# Patient Record
Sex: FEMALE | Race: WHITE | Employment: UNEMPLOYED | URBAN - METROPOLITAN AREA
[De-identification: names, ages, dates, MRNs, and addresses within clinical notes are randomized per-mention and may not be internally consistent; named-entity substitution may affect disease eponyms.]

---

## 2019-12-12 ENCOUNTER — HOSPITAL ENCOUNTER (EMERGENCY)
Age: 39
Discharge: HOME | End: 2019-12-12
Attending: EMERGENCY MEDICINE
Payer: MEDICAID

## 2019-12-12 ENCOUNTER — APPOINTMENT (OUTPATIENT)
Dept: ULTRASOUND IMAGING | Age: 39
End: 2019-12-12
Attending: NURSE PRACTITIONER
Payer: MEDICAID

## 2019-12-12 VITALS
DIASTOLIC BLOOD PRESSURE: 98 MMHG | RESPIRATION RATE: 18 BRPM | OXYGEN SATURATION: 100 % | TEMPERATURE: 98.2 F | HEART RATE: 86 BPM | SYSTOLIC BLOOD PRESSURE: 158 MMHG

## 2019-12-12 DIAGNOSIS — N93.8 DUB (DYSFUNCTIONAL UTERINE BLEEDING): Primary | ICD-10-CM

## 2019-12-12 LAB
ANION GAP SERPL CALC-SCNC: 7 MMOL/L (ref 5–15)
BUN SERPL-MCNC: 7 MG/DL (ref 6–20)
BUN/CREAT SERPL: 9 (ref 12–20)
CALCIUM SERPL-MCNC: 9.5 MG/DL (ref 8.5–10.1)
CHLORIDE SERPL-SCNC: 102 MMOL/L (ref 97–108)
CO2 SERPL-SCNC: 28 MMOL/L (ref 21–32)
CREAT SERPL-MCNC: 0.79 MG/DL (ref 0.55–1.02)
ERYTHROCYTE [DISTWIDTH] IN BLOOD BY AUTOMATED COUNT: 13.1 % (ref 11.5–14.5)
GLUCOSE SERPL-MCNC: 116 MG/DL (ref 65–100)
HCG SERPL-ACNC: <1 MIU/ML (ref 0–6)
HCT VFR BLD AUTO: 43.1 % (ref 35–47)
HGB BLD-MCNC: 14.1 G/DL (ref 11.5–16)
MCH RBC QN AUTO: 30.3 PG (ref 26–34)
MCHC RBC AUTO-ENTMCNC: 32.7 G/DL (ref 30–36.5)
MCV RBC AUTO: 92.5 FL (ref 80–99)
NRBC # BLD: 0 K/UL (ref 0–0.01)
NRBC BLD-RTO: 0 PER 100 WBC
PLATELET # BLD AUTO: 277 K/UL (ref 150–400)
PMV BLD AUTO: 11.5 FL (ref 8.9–12.9)
POTASSIUM SERPL-SCNC: 4 MMOL/L (ref 3.5–5.1)
RBC # BLD AUTO: 4.66 M/UL (ref 3.8–5.2)
SODIUM SERPL-SCNC: 137 MMOL/L (ref 136–145)
WBC # BLD AUTO: 10.6 K/UL (ref 3.6–11)

## 2019-12-12 PROCEDURE — 85027 COMPLETE CBC AUTOMATED: CPT

## 2019-12-12 PROCEDURE — 84702 CHORIONIC GONADOTROPIN TEST: CPT

## 2019-12-12 PROCEDURE — 99282 EMERGENCY DEPT VISIT SF MDM: CPT

## 2019-12-12 PROCEDURE — 80048 BASIC METABOLIC PNL TOTAL CA: CPT

## 2019-12-12 PROCEDURE — 36415 COLL VENOUS BLD VENIPUNCTURE: CPT

## 2019-12-12 PROCEDURE — 76830 TRANSVAGINAL US NON-OB: CPT

## 2019-12-12 PROCEDURE — 76856 US EXAM PELVIC COMPLETE: CPT

## 2019-12-12 RX ORDER — MEDROXYPROGESTERONE ACETATE 5 MG/1
10 TABLET ORAL DAILY
Qty: 14 TAB | Refills: 0 | Status: SHIPPED | OUTPATIENT
Start: 2019-12-12 | End: 2019-12-19

## 2019-12-12 NOTE — ED TRIAGE NOTES
TRIAGE NOTE:  Patient arrives with c/o vaginal bleeding x 13 days. Patient reports she soaks through one super tampon in 3-4 hours. Patient reports slight cramping \"here and there\".

## 2019-12-12 NOTE — LETTER
NOTIFICATION RETURN TO WORK / SCHOOL 
 
12/12/2019 4:28 PM 
 
Ms. Ina Moore Constantino [address] [id number] To Whom It May Concern: 
 
Ina Soliz is currently under the care of [facility] [place] [facility]. She will return to work/school on: December 17, 2019 If there are questions or concerns please have the patient contact our office.  
 
 
 
Sincerely, 
 
 
 
Lili Hickey NP  
4:28 PM

## 2019-12-12 NOTE — DISCHARGE INSTRUCTIONS
Patient Education        Abnormal Uterine Bleeding: Care Instructions  Your Care Instructions    Abnormal uterine bleeding (AUB) is irregular bleeding from the uterus that is longer or heavier than usual or does not occur at your regular time. Sometimes it is caused by changes in hormone levels. It can also be caused by growths in the uterus, such as fibroids or polyps. Sometimes a cause cannot be found. You may have heavy bleeding when you are not expecting your period. Your doctor may suggest a pregnancy test, if you think you are pregnant. Follow-up care is a key part of your treatment and safety. Be sure to make and go to all appointments, and call your doctor if you are having problems. It's also a good idea to know your test results and keep a list of the medicines you take. How can you care for yourself at home? · Be safe with medicines. Take pain medicines exactly as directed. ? If the doctor gave you a prescription medicine for pain, take it as prescribed. ? If you are not taking a prescription pain medicine, ask your doctor if you can take an over-the-counter medicine. · You may be low in iron because of blood loss. Eat a balanced diet that is high in iron and vitamin C. Foods rich in iron include red meat, shellfish, eggs, beans, and leafy green vegetables. Talk to your doctor about whether you need to take iron pills or a multivitamin. When should you call for help? Call 911 anytime you think you may need emergency care. For example, call if:    · You passed out (lost consciousness).    Call your doctor now or seek immediate medical care if:    · You have new or worse belly or pelvic pain.     · You have severe vaginal bleeding.     · You feel dizzy or lightheaded, or you feel like you may faint.    Watch closely for changes in your health, and be sure to contact your doctor if:    · You think you may be pregnant.     · Your bleeding gets worse.     · You do not get better as expected.    Where can you learn more? Go to http://stanley-kelly.info/. Enter C779 in the search box to learn more about \"Abnormal Uterine Bleeding: Care Instructions. \"  Current as of: February 19, 2019  Content Version: 12.2  © 4781-1902 [employer]. Care instructions adapted under license by [employer] (which disclaims liability or warranty for this information). If you have questions about a medical condition or this instruction, always ask your healthcare professional. [address] any warranty or liability for your use of this information. We hope that we have addressed all of your medical concerns. The examination and treatment you received in the Emergency Department were for an emergent problem and were not intended as complete care. It is important that you follow up with your healthcare provider(s) for ongoing care. If your symptoms worsen or do not improve as expected, and you are unable to reach your usual health care provider(s), you should return to the Emergency Department. Today's healthcare is undergoing tremendous change, and patient satisfaction surveys are one of the many tools to assess the quality of medical care. You may receive a survey from the [employer] regarding your experience in the Emergency Department. I hope that your experience has been completely positive, particularly the medical care that I provided. As such, please participate in the survey; anything less than excellent does not meet my expectations or intentions. [address] and [address] [place] [address] participate in nationally recognized quality of care measures. If your blood pressure is greater than 120/80, as reported below, we urge that you seek medical care to address the potential of high blood pressure, commonly known as hypertension.    Hypertension can be hereditary or can be caused by certain medical conditions, pain, stress, or \"white coat syndrome. \"       Please make an appointment with your health care provider(s) for follow up of your Emergency Department visit. VITALS:   Patient Vitals for the past 8 hrs:   Temp Pulse Resp BP SpO2   12/12/19 1403 98.2 °F (36.8 °C) 86 18 (!) 158/98 100 %          Thank you for allowing us to provide you with medical care today. We realize that you have many choices for your emergency care needs. Please choose us in the future for any continued health care needs. Kamilah Nice [facility], [facility].   Office: [phone]            Recent Results (from the past 24 hour(s))   CBC W/O DIFF    Collection Time: 12/12/19  2:41 PM   Result Value Ref Range    WBC 10.6 3.6 - 11.0 K/uL    RBC 4.66 3.80 - 5.20 M/uL    HGB 14.1 11.5 - 16.0 g/dL    HCT 43.1 35.0 - 47.0 %    MCV 92.5 80.0 - 99.0 FL    MCH 30.3 26.0 - 34.0 PG    MCHC 32.7 30.0 - 36.5 g/dL    RDW 13.1 11.5 - 14.5 %    PLATELET 980 116 - 097 K/uL    MPV 11.5 8.9 - 12.9 FL    NRBC 0.0 0  WBC    ABSOLUTE NRBC 0.00 0.00 - 0.11 K/uL   METABOLIC PANEL, BASIC    Collection Time: 12/12/19  2:41 PM   Result Value Ref Range    Sodium 137 136 - 145 mmol/L    Potassium 4.0 3.5 - 5.1 mmol/L    Chloride 102 97 - 108 mmol/L    CO2 28 21 - 32 mmol/L    Anion gap 7 5 - 15 mmol/L    Glucose 116 (H) 65 - 100 mg/dL    BUN 7 6 - 20 MG/DL    Creatinine 0.79 0.55 - 1.02 MG/DL    BUN/Creatinine ratio 9 (L) 12 - 20      GFR est AA >60 >60 ml/min/1.73m2    GFR est non-AA >60 >60 ml/min/1.73m2    Calcium 9.5 8.5 - 10.1 MG/DL   BETA HCG, QT    Collection Time: 12/12/19  2:41 PM   Result Value Ref Range    Beta HCG, QT <1 0 - 6 MIU/ML       Us Transvaginal    Result Date: 12/12/2019  INDICATION: bleeding Exam: Transabdominal and transvaginal ultrasound of the pelvis. Transvaginal ultrasound is performed to better evaluate the adnexa and endometrium.  Transabdominal: The uterus measures 9.4 cm x 6.2 cm x 5.9 cm. Echotexture of the uterus is normal. Endometrial stripe is not well visualized due to underdistended bladder. Ovaries are normal in size and echotexture. Right ovary measures 3.0 cm x 2.6 cm x 1.7 cm. Left ovary measures 2.9 cm x 2.0 cm x 2.3 cm. Vascular flow is noted in both ovaries. No adnexal mass is visualized. There is no free fluid in the pelvis. Transvaginal: The uterus measures 9.8 cm x 5.8 cm x 4.3 cm. There is a 3.8 cm x 4.2 cm x 2.3 cm fundal fibroid. Endometrial stripe is within normal limits for premenopausal female, measuring 6 mm. Ovaries are normal in size and echotexture. Right ovary measures 3.2 cm x 3.7 cm x 2.4 cm. Left ovary measures 2.9 cm x 3.9 cm x 2.9 cm. Vascular flow is noted in both ovaries. Follicles are noted in both ovaries. No adnexal mass is visualized. There is no free fluid in the pelvis. Note is made of multiple nabothian cysts. IMPRESSION: 1. 3.8 cm x 4.2 cm x 2.3 cm fundal fibroid. 2. Multiple nabothian cysts. Us Pelv Non Obs    Result Date: 12/12/2019  INDICATION: bleeding Exam: Transabdominal and transvaginal ultrasound of the pelvis. Transvaginal ultrasound is performed to better evaluate the adnexa and endometrium. Transabdominal: The uterus measures 9.4 cm x 6.2 cm x 5.9 cm. Echotexture of the uterus is normal. Endometrial stripe is not well visualized due to underdistended bladder. Ovaries are normal in size and echotexture. Right ovary measures 3.0 cm x 2.6 cm x 1.7 cm. Left ovary measures 2.9 cm x 2.0 cm x 2.3 cm. Vascular flow is noted in both ovaries. No adnexal mass is visualized. There is no free fluid in the pelvis. Transvaginal: The uterus measures 9.8 cm x 5.8 cm x 4.3 cm. There is a 3.8 cm x 4.2 cm x 2.3 cm fundal fibroid. Endometrial stripe is within normal limits for premenopausal female, measuring 6 mm. Ovaries are normal in size and echotexture. Right ovary measures 3.2 cm x 3.7 cm x 2.4 cm.  Left ovary measures 2.9 cm x 3.9 cm x 2.9 cm. Vascular flow is noted in both ovaries. Follicles are noted in both ovaries. No adnexal mass is visualized. There is no free fluid in the pelvis. Note is made of multiple nabothian cysts. IMPRESSION: 1. 3.8 cm x 4.2 cm x 2.3 cm fundal fibroid. 2. Multiple nabothian cysts.

## 2019-12-12 NOTE — ED NOTES
Discharge instructions given to pt. All questions answered and pt verbalized understanding. V/S stable @ time of discharge. Pt ambulatory out of unit. IV d/c'd from left forearm.

## 2019-12-14 NOTE — ED PROVIDER NOTES
The pt is a 46year old female  s/p tubal ligation who presents ambulatory from home to the ER with complaints of vaginal bleeding for the last 2 weeks. Historically her periods have only lasted two days. She reports utilizing 3 - 4 super tampons per day. Blood is dark and occasionally full of clots. No concern for STI. Pt denies fevers, chills, night sweats, chest pain, pressure, SOB, RAMON, PND, orthopnea, abdominal pain, n/v/d, melena, hematuria, dysuria, constipation, HA, dizziness, and syncope. History reviewed. No pertinent past medical history. Past Surgical History:  No date: HX ORTHOPAEDIC; Left      Comment:  plates  No date: HX TUBAL LIGATION    PCP:  None               History reviewed. No pertinent past medical history. Past Surgical History:   Procedure Laterality Date    HX ORTHOPAEDIC Left     plates    HX TUBAL LIGATION           History reviewed. No pertinent family history.     Social History     Socioeconomic History    Marital status:      Spouse name: Not on file    Number of children: Not on file    Years of education: Not on file    Highest education level: Not on file   Occupational History    Not on file   Social Needs    Financial resource strain: Not on file    Food insecurity:     Worry: Not on file     Inability: Not on file    Transportation needs:     Medical: Not on file     Non-medical: Not on file   Tobacco Use    Smoking status: Current Every Day Smoker     Packs/day: 0.50    Smokeless tobacco: Never Used   Substance and Sexual Activity    Alcohol use: No    Drug use: No    Sexual activity: Not on file   Lifestyle    Physical activity:     Days per week: Not on file     Minutes per session: Not on file    Stress: Not on file   Relationships    Social connections:     Talks on phone: Not on file     Gets together: Not on file     Attends Hinduism service: Not on file     Active member of club or organization: Not on file     Attends meetings of clubs or organizations: Not on file     Relationship status: Not on file    Intimate partner violence:     Fear of current or ex partner: Not on file     Emotionally abused: Not on file     Physically abused: Not on file     Forced sexual activity: Not on file   Other Topics Concern    Not on file   Social History Narrative    Not on file         ALLERGIES: Patient has no known allergies. Review of Systems   Constitutional: Negative for activity change, appetite change, chills, diaphoresis, fatigue, fever and unexpected weight change. HENT: Negative for congestion, ear pain, rhinorrhea, sinus pressure, sore throat and tinnitus. Eyes: Negative for photophobia, pain, discharge and visual disturbance. Respiratory: Negative for apnea, cough, choking, chest tightness, shortness of breath, wheezing and stridor. Cardiovascular: Negative for chest pain, palpitations and leg swelling. Gastrointestinal: Negative for abdominal pain, constipation, diarrhea, nausea and vomiting. Endocrine: Negative for polydipsia, polyphagia and polyuria. Genitourinary: Positive for vaginal bleeding. Negative for decreased urine volume, dyspareunia, dysuria, enuresis, flank pain, frequency, hematuria and urgency. Musculoskeletal: Negative for arthralgias, back pain, gait problem, myalgias and neck pain. Skin: Negative for color change, pallor, rash and wound. Allergic/Immunologic: Negative for immunocompromised state. Neurological: Negative for dizziness, seizures, syncope, weakness, light-headedness and headaches. Hematological: Does not bruise/bleed easily. Psychiatric/Behavioral: Negative for agitation and confusion. The patient is not nervous/anxious. Vitals:    12/12/19 1403   BP: (!) 158/98   Pulse: 86   Resp: 18   Temp: 98.2 °F (36.8 °C)   SpO2: 100%            Physical Exam  Vitals signs and nursing note reviewed. Constitutional:       General: She is not in acute distress.      Appearance: She is well-developed. She is not diaphoretic. HENT:      Head: Normocephalic. Right Ear: External ear normal.      Left Ear: External ear normal.      Mouth/Throat:      Pharynx: No oropharyngeal exudate. Eyes:      General: No scleral icterus. Right eye: No discharge. Left eye: No discharge. Conjunctiva/sclera: Conjunctivae normal.      Pupils: Pupils are equal, round, and reactive to light. Neck:      Musculoskeletal: Normal range of motion and neck supple. Thyroid: No thyromegaly. Vascular: No JVD. Trachea: No tracheal deviation. Cardiovascular:      Rate and Rhythm: Normal rate and regular rhythm. Heart sounds: Normal heart sounds. No murmur. No friction rub. No gallop. Pulmonary:      Effort: Pulmonary effort is normal. No respiratory distress. Breath sounds: Normal breath sounds. No stridor. No wheezing or rales. Chest:      Chest wall: No tenderness. Abdominal:      General: Bowel sounds are normal. There is no distension. Palpations: Abdomen is soft. There is no mass. Tenderness: There is no tenderness. There is no guarding or rebound. Musculoskeletal: Normal range of motion. General: No tenderness. Lymphadenopathy:      Cervical: No cervical adenopathy. Skin:     General: Skin is warm and dry. Coloration: Skin is not pale. Findings: No erythema or rash. Neurological:      Mental Status: She is alert and oriented to person, place, and time. Cranial Nerves: No cranial nerve deficit. Coordination: Coordination normal.      Deep Tendon Reflexes: Reflexes normal.   Psychiatric:         Behavior: Behavior normal.         Thought Content:  Thought content normal.         Judgment: Judgment normal.          MDM  Number of Diagnoses or Management Options  DUB (dysfunctional uterine bleeding):   Diagnosis management comments:    * routine laboratory data    * US pelvis   * HCG check        Amount and/or Complexity of Data Reviewed  Clinical lab tests: ordered and reviewed  Tests in the radiology section of CPT®: ordered and reviewed  Review and summarize past medical records: yes    Risk of Complications, Morbidity, and/or Mortality  General comments:    - stable, ambulatory pt in NAD    Patient Progress  Patient progress: stable         Procedures      1631 PM  Pt has been reevaluated. There are no new complaints, changes, or physical findings at this time. Medications have been reviewed w/ pt and/or family. Pt and/or family's questions have been answered. Pt and/or family expressed good understanding of the dx/tx/rx and is in agreement with plan of care. Pt instructed and agreed to f/u w/ GYN and to return to ED upon further deterioration. Pt is ready for discharge. LABORATORY TESTS:  No results found for this or any previous visit (from the past 12 hour(s)). IMAGING RESULTS:  US TRANSVAGINAL   Final Result   IMPRESSION:    1. 3.8 cm x 4.2 cm x 2.3 cm fundal fibroid. 2. Multiple nabothian cysts. US PELV NON OBS   Final Result   IMPRESSION:    1. 3.8 cm x 4.2 cm x 2.3 cm fundal fibroid. 2. Multiple nabothian cysts. No results found. MEDICATIONS GIVEN:  Medications - No data to display    IMPRESSION:  1. DUB (dysfunctional uterine bleeding)        PLAN:  1. Discharge Medication List as of 12/12/2019  4:28 PM      START taking these medications    Details   medroxyPROGESTERone (PROVERA) 5 mg tablet Take 2 Tabs by mouth daily for 7 days. , Print, Disp-14 Tab, R-0         CONTINUE these medications which have NOT CHANGED    Details   L. acidoph & paracasei- S therm- Bifido (MAAME-Q/RISAQUAD) 8 billion cell cap cap Take 1 Cap by mouth daily. , Print, Disp-14 Cap, R-0           2.    Follow-up Information     Follow up With Specialties Details Why Contact Info    Gynecologist  In 2 days      [address] [facility] Emergency Medicine  As needed, If symptoms worsen [address] [facility] [address] [facility]  [phone]        3.  Supportive care     Return to ED if worse       Travis De Oliveira NP  9:31 PM

## 2020-04-01 ENCOUNTER — HOSPITAL ENCOUNTER (EMERGENCY)
Age: 40
Discharge: HOME | End: 2020-04-01
Attending: EMERGENCY MEDICINE
Payer: SELF-PAY

## 2020-04-01 VITALS
TEMPERATURE: 97.6 F | SYSTOLIC BLOOD PRESSURE: 143 MMHG | HEART RATE: 70 BPM | DIASTOLIC BLOOD PRESSURE: 97 MMHG | RESPIRATION RATE: 18 BRPM | OXYGEN SATURATION: 100 %

## 2020-04-01 DIAGNOSIS — J06.9 ACUTE URI: ICD-10-CM

## 2020-04-01 DIAGNOSIS — R05.9 COUGH: Primary | ICD-10-CM

## 2020-04-01 PROCEDURE — 99283 EMERGENCY DEPT VISIT LOW MDM: CPT

## 2020-04-01 NOTE — DISCHARGE INSTRUCTIONS
Patient Education        Cough: Care Instructions  Your Care Instructions    A cough is your body's response to something that bothers your throat or airways. Many things can cause a cough. You might cough because of a cold or the flu, bronchitis, or asthma. Smoking, postnasal drip, allergies, and stomach acid that backs up into your throat also can cause coughs. A cough is a symptom, not a disease. Most coughs stop when the cause, such as a cold, goes away. You can take a few steps at home to cough less and feel better. Follow-up care is a key part of your treatment and safety. Be sure to make and go to all appointments, and call your doctor if you are having problems. It's also a good idea to know your test results and keep a list of the medicines you take. How can you care for yourself at home? · Drink lots of water and other fluids. This helps thin the mucus and soothes a dry or sore throat. Honey or lemon juice in hot water or tea may ease a dry cough. · Take cough medicine as directed by your doctor. · Prop up your head on pillows to help you breathe and ease a dry cough. · Try cough drops to soothe a dry or sore throat. Cough drops don't stop a cough. Medicine-flavored cough drops are no better than candy-flavored drops or hard candy. · Do not smoke. Avoid secondhand smoke. If you need help quitting, talk to your doctor about stop-smoking programs and medicines. These can increase your chances of quitting for good. When should you call for help? Call 911 anytime you think you may need emergency care.  For example, call if:    · You have severe trouble breathing.    Call your doctor now or seek immediate medical care if:    · You cough up blood.     · You have new or worse trouble breathing.     · You have a new or higher fever.     · You have a new rash.    Watch closely for changes in your health, and be sure to contact your doctor if:    · You cough more deeply or more often, especially if you notice more mucus or a change in the color of your mucus.     · You have new symptoms, such as a sore throat, an earache, or sinus pain.     · You do not get better as expected. Where can you learn more? Go to http://stanley-kelly.info/  Enter D279 in the search box to learn more about \"Cough: Care Instructions. \"  Current as of: June 9, 2019Content Version: 12.4  © 2444-7380 [employer]. Care instructions adapted under license by [employer] (which disclaims liability or warranty for this information). If you have questions about a medical condition or this instruction, always ask your healthcare professional. [place] [address] any warranty or liability for your use of this information. Patient Education        Upper Respiratory Infection (Cold): Care Instructions  Your Care Instructions    An upper respiratory infection, or URI, is an infection of the nose, sinuses, or throat. URIs are spread by coughs, sneezes, and direct contact. The common cold is the most frequent kind of URI. The flu and sinus infections are other kinds of URIs. Almost all URIs are caused by viruses. Antibiotics won't cure them. But you can treat most infections with home care. This may include drinking lots of fluids and taking over-the-counter pain medicine. You will probably feel better in 4 to 10 days. The doctor has checked you carefully, but problems can develop later. If you notice any problems or new symptoms, get medical treatment right away. Follow-up care is a key part of your treatment and safety. Be sure to make and go to all appointments, and call your doctor if you are having problems. It's also a good idea to know your test results and keep a list of the medicines you take. How can you care for yourself at home? · To prevent dehydration, drink plenty of fluids, enough so that your urine is light yellow or clear like water.  Choose water and other caffeine-free clear liquids until you feel better. If you have kidney, heart, or liver disease and have to limit fluids, talk with your doctor before you increase the amount of fluids you drink. · Take an over-the-counter pain medicine, such as acetaminophen (Tylenol), ibuprofen (Advil, Motrin), or naproxen (Aleve). Read and follow all instructions on the label. · Before you use cough and cold medicines, check the label. These medicines may not be safe for young children or for people with certain health problems. · Be careful when taking over-the-counter cold or flu medicines and Tylenol at the same time. Many of these medicines have acetaminophen, which is Tylenol. Read the labels to make sure that you are not taking more than the recommended dose. Too much acetaminophen (Tylenol) can be harmful. · Get plenty of rest.  · Do not smoke or allow others to smoke around you. If you need help quitting, talk to your doctor about stop-smoking programs and medicines. These can increase your chances of quitting for good. When should you call for help? Call 911 anytime you think you may need emergency care. For example, call if:    · You have severe trouble breathing.    Call your doctor now or seek immediate medical care if:    · You seem to be getting much sicker.     · You have new or worse trouble breathing.     · You have a new or higher fever.     · You have a new rash.    Watch closely for changes in your health, and be sure to contact your doctor if:    · You have a new symptom, such as a sore throat, an earache, or sinus pain.     · You cough more deeply or more often, especially if you notice more mucus or a change in the color of your mucus.     · You do not get better as expected. Where can you learn more? Go to http://stanley-kelly.info/  Enter K520 in the search box to learn more about \"Upper Respiratory Infection (Cold): Care Instructions. \"  Current as of: June 9, 2019Content Version: 12.4  © 1318-5486 [employer]. Care instructions adapted under license by [employer] (which disclaims liability or warranty for this information). If you have questions about a medical condition or this instruction, always ask your healthcare professional. [address] any warranty or liability for your use of this information. Patient Education        Viral Infections: Care Instructions  Your Care Instructions    You don't feel well, but it's not clear what's causing it. You may have a viral infection. Viruses cause many illnesses, such as the common cold, influenza, fever, rashes, and the diarrhea, nausea, and vomiting that are often called \"stomach flu. \" You may wonder if antibiotic medicines could make you feel better. But antibiotics only treat infections caused by bacteria. They don't work on viruses. The good news is that viral infections usually aren't serious. Most will go away in a few days without medical treatment. In the meantime, there are a few things you can do to make yourself more comfortable. Follow-up care is a key part of your treatment and safety. Be sure to make and go to all appointments, and call your doctor if you are having problems. It's also a good idea to know your test results and keep a list of the medicines you take. How can you care for yourself at home? · Get plenty of rest if you feel tired. · Take an over-the-counter pain medicine if needed, such as acetaminophen (Tylenol), ibuprofen (Advil, Motrin), or naproxen (Aleve). Read and follow all instructions on the label. · Be careful when taking over-the-counter cold or flu medicines and Tylenol at the same time. Many of these medicines have acetaminophen, which is Tylenol. Read the labels to make sure that you are not taking more than the recommended dose. Too much acetaminophen (Tylenol) can be harmful.   · Drink plenty of fluids, enough so that your urine is light yellow or clear like water. If you have kidney, heart, or liver disease and have to limit fluids, talk with your doctor before you increase the amount of fluids you drink. · Stay home from work, school, and other public places while you have a fever. When should you call for help? Call 911 anytime you think you may need emergency care. For example, call if:    · You have severe trouble breathing.     · You passed out (lost consciousness).    Call your doctor now or seek immediate medical care if:    · You seem to be getting much sicker.     · You have a new or higher fever.     · You have blood in your stools.     · You have new belly pain, or your pain gets worse.     · You have a new rash.    Watch closely for changes in your health, and be sure to contact your doctor if:    · You start to get better and then get worse.     · You do not get better as expected. Where can you learn more? Go to http://stanley-kelly.info/  Enter L906 in the search box to learn more about \"Viral Infections: Care Instructions. \"  Current as of: January 26, 2020Content Version: 12.4  © 0239-0038 [employer]wise, Incorporated. Care instructions adapted under license by [employer] (which disclaims liability or warranty for this information). If you have questions about a medical condition or this instruction, always ask your healthcare professional. [address] any warranty or liability for your use of this information.

## 2020-04-01 NOTE — ED PROVIDER NOTES
HPI patient is a 44-year-old white female presents the ED with reports that she returned from [place] on 19 March. ; she reports symptoms of cough, intermittent chest pain, and nasal congestion for 3 to 4 days. Denies fever, headache, neck pain, visual changes, focal weakness or rash. Denies any difficulty breathing, difficulty swallowing, SOB or  Abdominal pain. Denies any nausea, vomiting or diarrhea. Pt. Reports that she has not had any medications today prior to arrival.  Old charts reviewed      No past medical history on file. Past Surgical History:   Procedure Laterality Date    HX ORTHOPAEDIC Left     plates    HX TUBAL LIGATION           No family history on file.     Social History     Socioeconomic History    Marital status:      Spouse name: Not on file    Number of children: Not on file    Years of education: Not on file    Highest education level: Not on file   Occupational History    Not on file   Social Needs    Financial resource strain: Not on file    Food insecurity     Worry: Not on file     Inability: Not on file    Transportation needs     Medical: Not on file     Non-medical: Not on file   Tobacco Use    Smoking status: Current Every Day Smoker     Packs/day: 0.50    Smokeless tobacco: Never Used   Substance and Sexual Activity    Alcohol use: No    Drug use: No    Sexual activity: Not on file   Lifestyle    Physical activity     Days per week: Not on file     Minutes per session: Not on file    Stress: Not on file   Relationships    Social connections     Talks on phone: Not on file     Gets together: Not on file     Attends [employer] service: Not on file     Active member of club or organization: Not on file     Attends meetings of clubs or organizations: Not on file     Relationship status: Not on file    Intimate partner violence     Fear of current or ex partner: Not on file     Emotionally abused: Not on file     Physically abused: Not on file     Forced sexual activity: Not on file   Other Topics Concern    Not on file   Social History Narrative    Not on file         ALLERGIES: Patient has no known allergies. Review of Systems   Constitutional: Negative for activity change, appetite change, fever and unexpected weight change. HENT: Positive for congestion and rhinorrhea. Negative for ear pain, sore throat and trouble swallowing. Eyes: Negative for visual disturbance. Respiratory: Positive for cough and chest tightness. Negative for shortness of breath. Cardiovascular: Negative for chest pain, palpitations and leg swelling. Gastrointestinal: Negative for abdominal pain, diarrhea, nausea and vomiting. Genitourinary: Negative for difficulty urinating and dysuria. Musculoskeletal: Negative for arthralgias, back pain and myalgias. Skin: Negative for rash. Neurological: Negative for dizziness, light-headedness and headaches. All other systems reviewed and are negative. Vitals:    04/01/20 1543   BP: (!) 143/97   Pulse: 70   Resp: 18   Temp: 97.6 °F (36.4 °C)   SpO2: 100%            Physical Exam  Vitals signs and nursing note reviewed. Constitutional:       General: She is not in acute distress. Appearance: She is well-developed. She is not ill-appearing, toxic-appearing or diaphoretic. Comments: White female; former smoker; presently vapes; unemployed   HENT:      Head: Normocephalic. Mouth/Throat:      Mouth: Mucous membranes are moist.      Pharynx: No pharyngeal swelling or oropharyngeal exudate. Neck:      Musculoskeletal: Normal range of motion and neck supple. Cardiovascular:      Rate and Rhythm: Normal rate and regular rhythm. Pulmonary:      Effort: Pulmonary effort is normal.      Breath sounds: Normal breath sounds. Chest:      Chest wall: No tenderness. Abdominal:      Palpations: Abdomen is soft. Tenderness: There is no abdominal tenderness. There is no guarding or rebound.    Musculoskeletal: Normal range of motion. Lymphadenopathy:      Cervical: No cervical adenopathy. Skin:     General: Skin is warm and dry. Findings: No rash. Neurological:      General: No focal deficit present. Mental Status: She is alert. Psychiatric:         Mood and Affect: Mood normal.         Behavior: Behavior normal.          MDM       Procedures    Pt appears well ;recommend supportive treatment at home with rest, fluids, Motrin and/or Tylenol as needed for pain or fever. Close follow-up with PCP as needed. 4:18 PM  Patient's results and plan of care have been reviewed with her. Patient and/or family have verbally conveyed their understanding and agreement of the patient's signs, symptoms, diagnosis, treatment and prognosis and additionally agree to follow up as recommended or return to the Emergency Room should her condition change prior to follow-up. Discharge instructions have also been provided to the patient with some educational information regarding her diagnosis as well a list of reasons why she would want to return to the ER prior to her follow-up appointment should her condition change. Sahara Christianson NP

## 2020-04-01 NOTE — ED NOTES
Discharge instructions given to patient by PA/NP and RN. Pt has been given counseling regarding at home treatment plan. Pt verbalizes understanding of need to seek further treatment if symptoms worsen. Pt ambulated off of unit in no signs of distress.

## 2020-04-01 NOTE — ED TRIAGE NOTES
TRIAGE NOTE:  Patient arrives with c/o cough since 3/16 and reports chest pain and shortness of breath x 5 days with headache and intermittent diarrhea. Patient denies known sick contacts, only recent travel to [place].

## 2020-04-02 ENCOUNTER — PATIENT OUTREACH (OUTPATIENT)
Dept: CASE MANAGEMENT | Age: 40
End: 2020-04-02

## 2020-04-02 ENCOUNTER — OFFICE VISIT (OUTPATIENT)
Dept: PRIMARY CARE CLINIC | Age: 40
End: 2020-04-02

## 2020-04-02 DIAGNOSIS — R51.9 HEADACHE DUE TO VIRAL INFECTION: ICD-10-CM

## 2020-04-02 DIAGNOSIS — R05.9 COUGH: Primary | ICD-10-CM

## 2020-04-02 DIAGNOSIS — R19.7 DIARRHEA, UNSPECIFIED TYPE: ICD-10-CM

## 2020-04-02 DIAGNOSIS — B34.9 HEADACHE DUE TO VIRAL INFECTION: ICD-10-CM

## 2020-04-02 DIAGNOSIS — F17.200 SMOKER: ICD-10-CM

## 2020-04-02 DIAGNOSIS — R06.02 MILD SHORTNESS OF BREATH: ICD-10-CM

## 2020-04-02 LAB
QUICKVUE INFLUENZA TEST: NEGATIVE
VALID INTERNAL CONTROL?: YES

## 2020-04-02 RX ORDER — AZITHROMYCIN 250 MG/1
TABLET, FILM COATED ORAL
Qty: 6 TAB | Refills: 0 | Status: SHIPPED | OUTPATIENT
Start: 2020-04-02 | End: 2020-04-07

## 2020-04-02 NOTE — PROGRESS NOTES
COVID-19 Screening Initial Follow-up Note    Patient contacted regarding [id number] exposure. ( possible exposure- just returned from [place] on 3/19 and then developed sx's)      Care Transition Nurse/ [address] [place] [address] contacted the patient by telephone to perform post discharge assessment. Verified name and  with patient as identifiers. Provided introduction to self, and explanation of the CTN/ACM role, and reason for call due to risk factors for infection and/or exposure to COVID-19. Symptoms reviewed with patient who verbalized the following symptoms:   Patient reports no change in sx's since yesterday. Reported yesterday to have cough/SOB, chest pain, headache and diarrhea. Due to no new or worsening symptoms encounter was not routed to provider for escalation. Patient has following risk factors of: none. CTN/ACM reviewed discharge instructions, medical action plan and red flags such as increased shortness of breath, increasing fever and signs of decompensation with patient who verbalized understanding. Discussed exposure protocols and quarantine with CDC Guidelines What to do if you are sick with coronavirus disease 2019 Patient who was given an opportunity for questions and concerns. CTN/ACM provided contact information for future reference. Reviewed and educated patient on any new and changed medications related to discharge diagnosis     Plan for follow-up call in 5-7 days based on severity of symptoms and risk factors    Patient reports recent travel to [place] and returned 3/19. Patient reports her symptoms today have not changed much from yesterday. Would like to know where she can be tested to be sure. Discussed that [facility] in [place] was doing tests. She should call in advance to let them know she believes she is positive so they can take proper precautions if she comes in to test. Discussed self quarantine.  Patient reports her BF lives with her and he has no sx's except some congestion , discussed that as he has been continuously exposed to her they need to both self quarantine together for 14 days. If he should develop sx's later he needs to continue quarantine for 14 days after developing sx's. Patient notes understanding and reports they have been staying at home. Patient plans to call [facility] today as she would like testing.  ACM will reach out to her in 3-5 days to see how she is doing and if she had a test.

## 2020-04-02 NOTE — PROGRESS NOTES
Patient was seen at [facility] drive thru flu clinic. Please seen scanned form for visit documentation. No PCP.   Travel to [place] 3/3-3/16  Sx started 3/16 and not improving  + smoker  Has been pushing fluids but no OTC meds    Will rx antbx due to duration of sx, smoker

## 2020-04-08 ENCOUNTER — PATIENT OUTREACH (OUTPATIENT)
Dept: CASE MANAGEMENT | Age: 40
End: 2020-04-08

## 2020-04-08 NOTE — PROGRESS NOTES
ACM contacted patient for follow up on how she is doing and if she went for COVID testing .  Unable to reach patient and left  for return call with contact info provided

## 2020-04-21 ENCOUNTER — PATIENT OUTREACH (OUTPATIENT)
Dept: CASE MANAGEMENT | Age: 40
End: 2020-04-21

## 2021-09-22 ENCOUNTER — APPOINTMENT (OUTPATIENT)
Dept: CT IMAGING | Age: 41
End: 2021-09-22
Attending: STUDENT IN AN ORGANIZED HEALTH CARE EDUCATION/TRAINING PROGRAM
Payer: MEDICAID

## 2021-09-22 ENCOUNTER — HOSPITAL ENCOUNTER (EMERGENCY)
Age: 41
Discharge: HOME | End: 2021-09-22
Attending: STUDENT IN AN ORGANIZED HEALTH CARE EDUCATION/TRAINING PROGRAM
Payer: MEDICAID

## 2021-09-22 VITALS
HEART RATE: 57 BPM | SYSTOLIC BLOOD PRESSURE: 155 MMHG | RESPIRATION RATE: 18 BRPM | DIASTOLIC BLOOD PRESSURE: 91 MMHG | TEMPERATURE: 97.8 F | OXYGEN SATURATION: 98 %

## 2021-09-22 DIAGNOSIS — M54.2 NECK PAIN: Primary | ICD-10-CM

## 2021-09-22 LAB
ALBUMIN SERPL-MCNC: 4.1 G/DL (ref 3.5–5)
ALBUMIN/GLOB SERPL: 0.8 {RATIO} (ref 1.1–2.2)
ALP SERPL-CCNC: 82 U/L (ref 45–117)
ALT SERPL-CCNC: 51 U/L (ref 12–78)
ANION GAP SERPL CALC-SCNC: 6 MMOL/L (ref 5–15)
AST SERPL-CCNC: 35 U/L (ref 15–37)
BASOPHILS # BLD: 0 K/UL (ref 0–0.1)
BASOPHILS NFR BLD: 1 % (ref 0–1)
BILIRUB SERPL-MCNC: 0.8 MG/DL (ref 0.2–1)
BUN SERPL-MCNC: 7 MG/DL (ref 6–20)
BUN/CREAT SERPL: 8 (ref 12–20)
CALCIUM SERPL-MCNC: 9.9 MG/DL (ref 8.5–10.1)
CHLORIDE SERPL-SCNC: 103 MMOL/L (ref 97–108)
CO2 SERPL-SCNC: 29 MMOL/L (ref 21–32)
COMMENT, HOLDF: NORMAL
CREAT SERPL-MCNC: 0.85 MG/DL (ref 0.55–1.02)
DIFFERENTIAL METHOD BLD: ABNORMAL
EOSINOPHIL # BLD: 0.2 K/UL (ref 0–0.4)
EOSINOPHIL NFR BLD: 3 % (ref 0–7)
ERYTHROCYTE [DISTWIDTH] IN BLOOD BY AUTOMATED COUNT: 13 % (ref 11.5–14.5)
GLOBULIN SER CALC-MCNC: 5.2 G/DL (ref 2–4)
GLUCOSE SERPL-MCNC: 67 MG/DL (ref 65–100)
HCT VFR BLD AUTO: 49.7 % (ref 35–47)
HGB BLD-MCNC: 16.2 G/DL (ref 11.5–16)
IMM GRANULOCYTES # BLD AUTO: 0 K/UL (ref 0–0.04)
IMM GRANULOCYTES NFR BLD AUTO: 0 % (ref 0–0.5)
LYMPHOCYTES # BLD: 2.3 K/UL (ref 0.8–3.5)
LYMPHOCYTES NFR BLD: 29 % (ref 12–49)
MCH RBC QN AUTO: 29.9 PG (ref 26–34)
MCHC RBC AUTO-ENTMCNC: 32.6 G/DL (ref 30–36.5)
MCV RBC AUTO: 91.7 FL (ref 80–99)
MONOCYTES # BLD: 0.6 K/UL (ref 0–1)
MONOCYTES NFR BLD: 7 % (ref 5–13)
NEUTS SEG # BLD: 5 K/UL (ref 1.8–8)
NEUTS SEG NFR BLD: 60 % (ref 32–75)
NRBC # BLD: 0 K/UL (ref 0–0.01)
NRBC BLD-RTO: 0 PER 100 WBC
PLATELET # BLD AUTO: 302 K/UL (ref 150–400)
PMV BLD AUTO: 11.3 FL (ref 8.9–12.9)
POTASSIUM SERPL-SCNC: 3.5 MMOL/L (ref 3.5–5.1)
PROT SERPL-MCNC: 9.3 G/DL (ref 6.4–8.2)
RBC # BLD AUTO: 5.42 M/UL (ref 3.8–5.2)
SAMPLES BEING HELD,HOLD: NORMAL
SODIUM SERPL-SCNC: 138 MMOL/L (ref 136–145)
WBC # BLD AUTO: 8.1 K/UL (ref 3.6–11)

## 2021-09-22 PROCEDURE — 99283 EMERGENCY DEPT VISIT LOW MDM: CPT

## 2021-09-22 PROCEDURE — 36415 COLL VENOUS BLD VENIPUNCTURE: CPT

## 2021-09-22 PROCEDURE — 74011000636 HC RX REV CODE- 636: Performed by: RADIOLOGY

## 2021-09-22 PROCEDURE — 70491 CT SOFT TISSUE NECK W/DYE: CPT

## 2021-09-22 PROCEDURE — 80053 COMPREHEN METABOLIC PANEL: CPT

## 2021-09-22 PROCEDURE — 85025 COMPLETE CBC W/AUTO DIFF WBC: CPT

## 2021-09-22 RX ADMIN — IOPAMIDOL 100 ML: 612 INJECTION, SOLUTION INTRAVENOUS at 03:11

## 2021-09-22 NOTE — ED TRIAGE NOTES
Patient arrives ambulatory from home. States she injected cocaine into her left neck a few days ago. Patient now has swelling and tenderness to the site. +chills. Patient also endorses chest pain.

## 2021-09-22 NOTE — ED PROVIDER NOTES
Patient is a 52-year-old female the past medical history of hepatitis C and IV drug use who presents to ED complaining of neck pain and chills which started earlier today. Patient reports she recently injected cocaine into her left side of her neck a few days ago. Reports since then she has developed slight swelling to left side of neck. Also reports she has developed chills, body aches, diaphoresis and generalized weakness. States she injected into her arm one time prior and developed an abscess and is insure if this is an abscess. Patient denies any fever, chest pain, palpitations, skin erythema/warmth/wound drainage. Past Medical History:   Diagnosis Date    Hepatitis C 2019       Past Surgical History:   Procedure Laterality Date    HX ORTHOPAEDIC Left     plates    HX TUBAL LIGATION           History reviewed. No pertinent family history. Social History     Socioeconomic History    Marital status:      Spouse name: Not on file    Number of children: Not on file    Years of education: Not on file    Highest education level: Not on file   Occupational History    Not on file   Tobacco Use    Smoking status: Current Every Day Smoker     Packs/day: 0.50    Smokeless tobacco: Never Used   Substance and Sexual Activity    Alcohol use: No    Drug use: Yes     Types: Cocaine, Marijuana    Sexual activity: Not on file   Other Topics Concern    Not on file   Social History Narrative    Not on file     Social Determinants of Health     Financial Resource Strain:     Difficulty of Paying Living Expenses:    Food Insecurity:     Worried About Running Out of Food in the Last Year:     [address] in the Last Year:    Transportation Needs:     Lack of Transportation (Medical):      Lack of Transportation (Non-Medical):    Physical Activity:     Days of Exercise per Week:     Minutes of Exercise per Session:    Stress:     Feeling of Stress :    Social Connections:     Frequency of Communication with Friends and Family:     Frequency of Social Gatherings with Friends and Family:     Attends Yazidism Services:     Active Member of Clubs or Organizations:     Attends Club or Organization Meetings:     Marital Status:    Intimate Partner Violence:     Fear of Current or Ex-Partner:     Emotionally Abused:     Physically Abused:     Sexually Abused: ALLERGIES: Patient has no known allergies. Review of Systems   Constitutional: Positive for chills, diaphoresis and fatigue. Negative for activity change, appetite change and fever. HENT: Negative for congestion and sore throat. Eyes: Negative for pain and visual disturbance. Respiratory: Negative for cough and shortness of breath. Cardiovascular: Negative for chest pain, palpitations and leg swelling. Gastrointestinal: Negative for abdominal distention, abdominal pain, constipation, diarrhea, nausea and vomiting. Genitourinary: Negative for decreased urine volume, dysuria, flank pain, frequency and urgency. Musculoskeletal: Positive for myalgias and neck pain. Negative for back pain. Skin: Negative for rash and wound. Allergic/Immunologic: Negative for immunocompromised state. Neurological: Negative for dizziness, syncope, weakness, light-headedness, numbness and headaches. Psychiatric/Behavioral: Negative for confusion. All other systems reviewed and are negative. Vitals:    09/22/21 0051   BP: (!) 148/100   Pulse: 80   Resp: 16   Temp: 97.8 °F (36.6 °C)   SpO2: 98%            Physical Exam  Vitals and nursing note reviewed. Constitutional:       General: She is not in acute distress. Appearance: Normal appearance. She is well-developed. She is not toxic-appearing. HENT:      Head: Normocephalic and atraumatic. Nose: Nose normal.      Mouth/Throat:      Mouth: Mucous membranes are moist.   Eyes:      General: Lids are normal.      Extraocular Movements: Extraocular movements intact. Conjunctiva/sclera: Conjunctivae normal.   Neck:      Comments: Track marks noted to left side of neck near carotid. Slight swelling noted- no erythema, warmth or drainage. No palpable fluctuance or induration. Cardiovascular:      Rate and Rhythm: Normal rate and regular rhythm. Pulses: Normal pulses. Heart sounds: Normal heart sounds, S1 normal and S2 normal.   Pulmonary:      Effort: Pulmonary effort is normal. No accessory muscle usage. Breath sounds: Normal breath sounds. Abdominal:      Palpations: Abdomen is soft. Tenderness: There is no abdominal tenderness. Musculoskeletal:         General: Normal range of motion. Cervical back: Normal range of motion and neck supple. Skin:     General: Skin is warm and dry. Capillary Refill: Capillary refill takes less than 2 seconds. Neurological:      General: No focal deficit present. Mental Status: She is alert and oriented to person, place, and time. Mental status is at baseline. Psychiatric:         Attention and Perception: Attention normal.         Mood and Affect: Mood and affect normal.         Speech: Speech normal.         Behavior: Behavior is cooperative. Thought Content: Thought content normal.         Cognition and Memory: Cognition normal.         Judgment: Judgment normal.          MDM  Number of Diagnoses or Management Options  Diagnosis management comments: Patient presents with skin problem and concern for abscess in her neck after injecting cocaine into left anterior neck a few days ago. VSS. CBC and CMP ordered. Dr. Gaetano Qureshi performed bedside ultrasound and advised to order CT neck soft tissue to r/o abscess or other acute abnormalities.         Amount and/or Complexity of Data Reviewed  Clinical lab tests: reviewed and ordered  Tests in the radiology section of CPT®: ordered  Discuss the patient with other providers: yes (Dr. Gaetano Qureshi, ED attending )           Procedures

## 2021-09-22 NOTE — DISCHARGE INSTRUCTIONS
You presented to the ED with pain in her neck after injection. Ultrasound showed possible collection of fluid and therefore CT with contrast was done. No significant abnormalities on CT concerning for abscess however you do have an enlarged thyroid with multiple nodules and you should follow-up with your primary care physician. Do not have a primary care physician please use information with your discharge paperwork to obtain 1.

## 2021-10-15 ENCOUNTER — APPOINTMENT (OUTPATIENT)
Dept: CT IMAGING | Age: 41
End: 2021-10-15
Attending: EMERGENCY MEDICINE
Payer: MEDICAID

## 2021-10-15 ENCOUNTER — HOSPITAL ENCOUNTER (EMERGENCY)
Age: 41
Discharge: LEFT BEFORE DISCHARGE | End: 2021-10-15
Payer: MEDICAID

## 2021-10-15 VITALS
TEMPERATURE: 98.6 F | OXYGEN SATURATION: 100 % | WEIGHT: 222 LBS | HEIGHT: 69 IN | SYSTOLIC BLOOD PRESSURE: 140 MMHG | HEART RATE: 76 BPM | RESPIRATION RATE: 18 BRPM | DIASTOLIC BLOOD PRESSURE: 114 MMHG | BODY MASS INDEX: 32.88 KG/M2

## 2021-10-15 LAB
ALBUMIN SERPL-MCNC: 3.7 G/DL (ref 3.5–5)
ALBUMIN/GLOB SERPL: 0.9 {RATIO} (ref 1.1–2.2)
ALP SERPL-CCNC: 68 U/L (ref 45–117)
ALT SERPL-CCNC: 65 U/L (ref 12–78)
ANION GAP SERPL CALC-SCNC: 1 MMOL/L (ref 5–15)
AST SERPL-CCNC: 33 U/L (ref 15–37)
BASOPHILS # BLD: 0 K/UL (ref 0–0.1)
BASOPHILS NFR BLD: 0 % (ref 0–1)
BILIRUB SERPL-MCNC: 0.3 MG/DL (ref 0.2–1)
BUN SERPL-MCNC: 11 MG/DL (ref 6–20)
BUN/CREAT SERPL: 15 (ref 12–20)
CALCIUM SERPL-MCNC: 9.1 MG/DL (ref 8.5–10.1)
CHLORIDE SERPL-SCNC: 107 MMOL/L (ref 97–108)
CO2 SERPL-SCNC: 29 MMOL/L (ref 21–32)
CREAT SERPL-MCNC: 0.74 MG/DL (ref 0.55–1.02)
DIFFERENTIAL METHOD BLD: NORMAL
EOSINOPHIL # BLD: 0.1 K/UL (ref 0–0.4)
EOSINOPHIL NFR BLD: 1 % (ref 0–7)
ERYTHROCYTE [DISTWIDTH] IN BLOOD BY AUTOMATED COUNT: 13 % (ref 11.5–14.5)
GLOBULIN SER CALC-MCNC: 4.2 G/DL (ref 2–4)
GLUCOSE SERPL-MCNC: 73 MG/DL (ref 65–100)
HCT VFR BLD AUTO: 43.1 % (ref 35–47)
HGB BLD-MCNC: 14.3 G/DL (ref 11.5–16)
IMM GRANULOCYTES # BLD AUTO: 0 K/UL (ref 0–0.04)
IMM GRANULOCYTES NFR BLD AUTO: 0 % (ref 0–0.5)
INR PPP: 1 (ref 0.9–1.1)
LYMPHOCYTES # BLD: 2.1 K/UL (ref 0.8–3.5)
LYMPHOCYTES NFR BLD: 20 % (ref 12–49)
MAGNESIUM SERPL-MCNC: 2.1 MG/DL (ref 1.6–2.4)
MCH RBC QN AUTO: 30.1 PG (ref 26–34)
MCHC RBC AUTO-ENTMCNC: 33.2 G/DL (ref 30–36.5)
MCV RBC AUTO: 90.7 FL (ref 80–99)
MONOCYTES # BLD: 0.6 K/UL (ref 0–1)
MONOCYTES NFR BLD: 6 % (ref 5–13)
NEUTS SEG # BLD: 7.5 K/UL (ref 1.8–8)
NEUTS SEG NFR BLD: 73 % (ref 32–75)
NRBC # BLD: 0 K/UL (ref 0–0.01)
NRBC BLD-RTO: 0 PER 100 WBC
PLATELET # BLD AUTO: 281 K/UL (ref 150–400)
PMV BLD AUTO: 11.4 FL (ref 8.9–12.9)
POTASSIUM SERPL-SCNC: 3.7 MMOL/L (ref 3.5–5.1)
PROT SERPL-MCNC: 7.9 G/DL (ref 6.4–8.2)
PROTHROMBIN TIME: 10.1 SEC (ref 9–11.1)
RBC # BLD AUTO: 4.75 M/UL (ref 3.8–5.2)
SODIUM SERPL-SCNC: 137 MMOL/L (ref 136–145)
T3FREE SERPL-MCNC: 3.2 PG/ML (ref 2.2–4)
T4 FREE SERPL-MCNC: 1.1 NG/DL (ref 0.8–1.5)
TSH SERPL DL<=0.05 MIU/L-ACNC: 0.2 UIU/ML (ref 0.36–3.74)
WBC # BLD AUTO: 10.4 K/UL (ref 3.6–11)

## 2021-10-15 PROCEDURE — 84481 FREE ASSAY (FT-3): CPT

## 2021-10-15 PROCEDURE — 93005 ELECTROCARDIOGRAM TRACING: CPT

## 2021-10-15 PROCEDURE — 36415 COLL VENOUS BLD VENIPUNCTURE: CPT

## 2021-10-15 PROCEDURE — 84443 ASSAY THYROID STIM HORMONE: CPT

## 2021-10-15 PROCEDURE — 84439 ASSAY OF FREE THYROXINE: CPT

## 2021-10-15 PROCEDURE — 80053 COMPREHEN METABOLIC PANEL: CPT

## 2021-10-15 PROCEDURE — 75810000275 HC EMERGENCY DEPT VISIT NO LEVEL OF CARE

## 2021-10-15 PROCEDURE — 83735 ASSAY OF MAGNESIUM: CPT

## 2021-10-15 PROCEDURE — 85610 PROTHROMBIN TIME: CPT

## 2021-10-15 PROCEDURE — 85025 COMPLETE CBC W/AUTO DIFF WBC: CPT

## 2021-10-16 LAB
ATRIAL RATE: 70 BPM
CALCULATED P AXIS, ECG09: 31 DEGREES
CALCULATED R AXIS, ECG10: -18 DEGREES
CALCULATED T AXIS, ECG11: 12 DEGREES
DIAGNOSIS, 93000: NORMAL
P-R INTERVAL, ECG05: 118 MS
Q-T INTERVAL, ECG07: 418 MS
QRS DURATION, ECG06: 78 MS
QTC CALCULATION (BEZET), ECG08: 451 MS
VENTRICULAR RATE, ECG03: 70 BPM

## 2021-10-23 ENCOUNTER — APPOINTMENT (OUTPATIENT)
Dept: GENERAL RADIOLOGY | Age: 41
End: 2021-10-23
Attending: EMERGENCY MEDICINE
Payer: MEDICAID

## 2021-10-23 ENCOUNTER — HOSPITAL ENCOUNTER (EMERGENCY)
Age: 41
Discharge: LEFT BEFORE DISCHARGE | End: 2021-10-23
Admitting: EMERGENCY MEDICINE
Payer: MEDICAID

## 2021-10-23 VITALS
RESPIRATION RATE: 18 BRPM | WEIGHT: 222 LBS | DIASTOLIC BLOOD PRESSURE: 87 MMHG | BODY MASS INDEX: 32.88 KG/M2 | TEMPERATURE: 98.7 F | SYSTOLIC BLOOD PRESSURE: 130 MMHG | HEIGHT: 69 IN | OXYGEN SATURATION: 99 % | HEART RATE: 67 BPM

## 2021-10-23 LAB
ALBUMIN SERPL-MCNC: 3.8 G/DL (ref 3.5–5)
ALBUMIN/GLOB SERPL: 0.8 {RATIO} (ref 1.1–2.2)
ALP SERPL-CCNC: 78 U/L (ref 45–117)
ALT SERPL-CCNC: 96 U/L (ref 12–78)
ANION GAP SERPL CALC-SCNC: 5 MMOL/L (ref 5–15)
AST SERPL-CCNC: 48 U/L (ref 15–37)
BASOPHILS # BLD: 0.1 K/UL (ref 0–0.1)
BASOPHILS NFR BLD: 1 % (ref 0–1)
BILIRUB SERPL-MCNC: 0.6 MG/DL (ref 0.2–1)
BNP SERPL-MCNC: 84 PG/ML
BUN SERPL-MCNC: 7 MG/DL (ref 6–20)
BUN/CREAT SERPL: 11 (ref 12–20)
CALCIUM SERPL-MCNC: 9.4 MG/DL (ref 8.5–10.1)
CHLORIDE SERPL-SCNC: 108 MMOL/L (ref 97–108)
CK SERPL-CCNC: 36 U/L (ref 26–192)
CO2 SERPL-SCNC: 24 MMOL/L (ref 21–32)
CREAT SERPL-MCNC: 0.65 MG/DL (ref 0.55–1.02)
DIFFERENTIAL METHOD BLD: ABNORMAL
EOSINOPHIL # BLD: 0.1 K/UL (ref 0–0.4)
EOSINOPHIL NFR BLD: 1 % (ref 0–7)
ERYTHROCYTE [DISTWIDTH] IN BLOOD BY AUTOMATED COUNT: 13 % (ref 11.5–14.5)
GLOBULIN SER CALC-MCNC: 4.5 G/DL (ref 2–4)
GLUCOSE SERPL-MCNC: 86 MG/DL (ref 65–100)
HCT VFR BLD AUTO: 43 % (ref 35–47)
HGB BLD-MCNC: 14.9 G/DL (ref 11.5–16)
IMM GRANULOCYTES # BLD AUTO: 0 K/UL (ref 0–0.04)
IMM GRANULOCYTES NFR BLD AUTO: 0 % (ref 0–0.5)
LYMPHOCYTES # BLD: 2 K/UL (ref 0.8–3.5)
LYMPHOCYTES NFR BLD: 16 % (ref 12–49)
MCH RBC QN AUTO: 30.3 PG (ref 26–34)
MCHC RBC AUTO-ENTMCNC: 34.7 G/DL (ref 30–36.5)
MCV RBC AUTO: 87.4 FL (ref 80–99)
MONOCYTES # BLD: 0.7 K/UL (ref 0–1)
MONOCYTES NFR BLD: 6 % (ref 5–13)
NEUTS SEG # BLD: 9.6 K/UL (ref 1.8–8)
NEUTS SEG NFR BLD: 76 % (ref 32–75)
NRBC # BLD: 0 K/UL (ref 0–0.01)
NRBC BLD-RTO: 0 PER 100 WBC
PLATELET # BLD AUTO: 299 K/UL (ref 150–400)
PMV BLD AUTO: 11 FL (ref 8.9–12.9)
POTASSIUM SERPL-SCNC: 3.6 MMOL/L (ref 3.5–5.1)
PROT SERPL-MCNC: 8.3 G/DL (ref 6.4–8.2)
RBC # BLD AUTO: 4.92 M/UL (ref 3.8–5.2)
SODIUM SERPL-SCNC: 137 MMOL/L (ref 136–145)
TROPONIN-HIGH SENSITIVITY: 4 NG/L (ref 0–51)
TSH SERPL DL<=0.05 MIU/L-ACNC: 0.22 UIU/ML (ref 0.36–3.74)
WBC # BLD AUTO: 12.3 K/UL (ref 3.6–11)

## 2021-10-23 PROCEDURE — 75810000275 HC EMERGENCY DEPT VISIT NO LEVEL OF CARE

## 2021-10-23 PROCEDURE — 71046 X-RAY EXAM CHEST 2 VIEWS: CPT

## 2021-10-23 PROCEDURE — 82550 ASSAY OF CK (CPK): CPT

## 2021-10-23 PROCEDURE — 84443 ASSAY THYROID STIM HORMONE: CPT

## 2021-10-23 PROCEDURE — 83880 ASSAY OF NATRIURETIC PEPTIDE: CPT

## 2021-10-23 PROCEDURE — 84484 ASSAY OF TROPONIN QUANT: CPT

## 2021-10-23 PROCEDURE — 36415 COLL VENOUS BLD VENIPUNCTURE: CPT

## 2021-10-23 PROCEDURE — 85025 COMPLETE CBC W/AUTO DIFF WBC: CPT

## 2021-10-23 PROCEDURE — 80053 COMPREHEN METABOLIC PANEL: CPT

## 2021-10-23 PROCEDURE — 93005 ELECTROCARDIOGRAM TRACING: CPT

## 2021-10-24 LAB
ATRIAL RATE: 65 BPM
CALCULATED P AXIS, ECG09: 33 DEGREES
CALCULATED R AXIS, ECG10: -22 DEGREES
CALCULATED T AXIS, ECG11: 12 DEGREES
DIAGNOSIS, 93000: NORMAL
P-R INTERVAL, ECG05: 122 MS
Q-T INTERVAL, ECG07: 414 MS
QRS DURATION, ECG06: 76 MS
QTC CALCULATION (BEZET), ECG08: 430 MS
VENTRICULAR RATE, ECG03: 65 BPM

## 2021-11-15 ENCOUNTER — OFFICE VISIT (OUTPATIENT)
Dept: INTERNAL MEDICINE CLINIC | Age: 41
End: 2021-11-15
Payer: MEDICAID

## 2021-11-15 VITALS
BODY MASS INDEX: 33.09 KG/M2 | DIASTOLIC BLOOD PRESSURE: 84 MMHG | TEMPERATURE: 98.4 F | HEIGHT: 69 IN | SYSTOLIC BLOOD PRESSURE: 135 MMHG | OXYGEN SATURATION: 97 % | WEIGHT: 223.4 LBS | RESPIRATION RATE: 20 BRPM | HEART RATE: 62 BPM

## 2021-11-15 DIAGNOSIS — D72.829 LEUKOCYTOSIS, UNSPECIFIED TYPE: ICD-10-CM

## 2021-11-15 DIAGNOSIS — E04.2 MULTIPLE THYROID NODULES: ICD-10-CM

## 2021-11-15 DIAGNOSIS — F41.9 ANXIETY: ICD-10-CM

## 2021-11-15 DIAGNOSIS — F14.10: ICD-10-CM

## 2021-11-15 DIAGNOSIS — K21.9 GASTROESOPHAGEAL REFLUX DISEASE WITHOUT ESOPHAGITIS: ICD-10-CM

## 2021-11-15 DIAGNOSIS — B18.2 CHRONIC HEPATITIS C WITHOUT HEPATIC COMA (HCC): ICD-10-CM

## 2021-11-15 DIAGNOSIS — Z76.89 ESTABLISHING CARE WITH NEW DOCTOR, ENCOUNTER FOR: Primary | ICD-10-CM

## 2021-11-15 DIAGNOSIS — E66.9 OBESITY (BMI 30-39.9): ICD-10-CM

## 2021-11-15 PROCEDURE — 99204 OFFICE O/P NEW MOD 45 MIN: CPT | Performed by: FAMILY MEDICINE

## 2021-11-15 RX ORDER — PHENOL/SODIUM PHENOLATE
20 AEROSOL, SPRAY (ML) MUCOUS MEMBRANE DAILY
Qty: 30 TABLET | Refills: 2 | Status: SHIPPED | OUTPATIENT
Start: 2021-11-15 | End: 2021-11-16 | Stop reason: SDUPTHER

## 2021-11-15 RX ORDER — HYDROXYZINE HYDROCHLORIDE 10 MG/1
10 TABLET, FILM COATED ORAL
Qty: 30 TABLET | Refills: 0 | Status: SHIPPED | OUTPATIENT
Start: 2021-11-15 | End: 2021-11-16 | Stop reason: SDUPTHER

## 2021-11-15 NOTE — PATIENT INSTRUCTIONS
Thyroid Nodules: Care Instructions  Your Care Instructions  Thyroid nodules are growths or lumps in the thyroid gland. Your thyroid is in the front of your neck. It controls how your body uses energy. You may have tests to see if the nodule is caused by cancer. Most nodules aren't cancer and don't cause problems. Many don't even need treatment. If you do have cancer, it can usually be cured. Treatment will probably include surgery. You may also get radioactive iodine treatment. If your thyroid can't make thyroid hormone after treatment, you can take a pill every day to replace the hormone. Follow-up care is a key part of your treatment and safety. Be sure to make and go to all appointments, and call your doctor if you are having problems. It's also a good idea to know your test results and keep a list of the medicines you take. How can you care for yourself at home? · Be safe with medicines. If you take thyroid hormone medicine:  ? Take it exactly as prescribed. Call your doctor if you think you are having a problem with your medicine. If you take the right amount and don't skip doses, you probably won't have side effects. ? Tell your doctor about any medicines you take. This includes over-the-counter medicines. When should you call for help? Call 911 anytime you think you may need emergency care. For example, call if:    · You lose consciousness. Call your doctor now or seek immediate medical care if:    · You have shortness of breath. Watch closely for changes in your health, and be sure to contact your doctor if:    · You have pain in your neck, jaw, or ear.     · You have problems swallowing.     · You feel weak and tired.     · You have nervousness, a fast heartbeat, hand tremors, problems sleeping, increased sweating, and weight loss.     · You do not feel better even though you are taking your medicine. Where can you learn more?   Go to http://www.gray.com/  Enter F731 in the search box to learn more about \"Thyroid Nodules: Care Instructions. \"  Current as of: December 2, 2020               Content Version: 13.0  © 2006-2021 [employer]. Care instructions adapted under license by [employer] (which disclaims liability or warranty for this information). If you have questions about a medical condition or this instruction, always ask your healthcare professional. [address] any warranty or liability for your use of this information. Learning About Hepatitis C  What is hepatitis C? Hepatitis C is a disease caused by a virus that infects the liver. In time, it can lead to cirrhosis, liver cancer, or liver failure. Many people don't know that they have the virus until they already have some liver damage. This can take many years. Some people who get the infection have it for a short time (acute) and then get better. But most people who have it go on to develop long-term, or chronic, infection. Although hepatitis C can be very serious, most people can manage it and lead active, full lives. What happens when you have hepatitis C? Some people who get hepatitis C have it for a short time (acute infection) and then get better. But most people get long-term, or chronic, infection. This can lead to liver damage. Long-term hepatitis C often causes tiny scars in your liver. If you have a lot of scars, it becomes hard for your liver to work well. Over time, some people have more serious problems such as cirrhosis or liver cancer. What are the symptoms? Most people who have hepatitis C don't have symptoms. If there are symptoms, they may include fatigue, pain in the belly and joints, itchy skin, sore muscles, and dark urine. There may also be jaundice. This is a condition in which the skin and the whites of the eyes look yellow. How can you prevent hepatitis C? There is no vaccine to prevent the disease.  Anyone who has hepatitis C can spread the virus to someone else. You can take steps to make infection less likely. · Don't share needles to inject drugs. · Make sure all tools and supplies are sterilized if you get a tattoo or body piercing, or have acupuncture. · Don't share anything that might have infected blood on it. This may include a toothbrush, razor, or nail clippers. · Use latex condoms during sex if you have HIV. Also use latex condoms if you have multiple sex partners or a sexually transmitted infection. How is hepatitis C treated? · If you have acute hepatitis C, your doctor will probably prescribe medicine. · If you have chronic hepatitis C, your treatment depends on whether you have liver damage, other health problems you may have, and how much virus is in your body and what type it is. · You will need to see your doctor regularly to have blood tests to check your liver. Follow-up care is a key part of your treatment and safety. Be sure to make and go to all appointments, and call your doctor if you are having problems. It's also a good idea to know your test results and keep a list of the medicines you take. Where can you learn more? Go to http://www.gray.com/  Enter C666 in the search box to learn more about \"Learning About Hepatitis C.\"  Current as of: July 1, 2021               Content Version: 13.0  © 3982-5899 [employer]. Care instructions adapted under license by [employer] (which disclaims liability or warranty for this information). If you have questions about a medical condition or this instruction, always ask your healthcare professional. [place] [address] any warranty or liability for your use of this information.
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
## 2021-11-15 NOTE — PROGRESS NOTES
Chief Complaint   Patient presents with    Establish Care     thyroid issues, anxiety, manic depressive dx 2008     1. Have you been to the ER, urgent care clinic since your last visit? Hospitalized since your last visit? Yes [facility]-anxiety & thyroid issues    2. Have you seen or consulted any other health care providers outside of the [address] [place] [address] since your last visit? Include any pap smears or colon screening.  No  Visit Vitals  /84   Pulse 62   Temp 98.4 °F (36.9 °C) (Oral)   Resp 20   Ht 5' 9\" (1.753 m)   Wt 223 lb 6.4 oz (101.3 kg)   SpO2 97%   BMI 32.99 kg/m²
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
## 2021-11-16 DIAGNOSIS — K21.9 GASTROESOPHAGEAL REFLUX DISEASE WITHOUT ESOPHAGITIS: ICD-10-CM

## 2021-11-16 DIAGNOSIS — F41.9 ANXIETY: ICD-10-CM

## 2021-11-16 RX ORDER — PHENOL/SODIUM PHENOLATE
20 AEROSOL, SPRAY (ML) MUCOUS MEMBRANE DAILY
Qty: 30 TABLET | Refills: 2 | Status: SHIPPED | OUTPATIENT
Start: 2021-11-16 | End: 2022-03-04

## 2021-11-16 RX ORDER — HYDROXYZINE HYDROCHLORIDE 10 MG/1
10 TABLET, FILM COATED ORAL
Qty: 30 TABLET | Refills: 0 | Status: SHIPPED | OUTPATIENT
Start: 2021-11-16 | End: 2021-11-26

## 2021-12-01 PROBLEM — F41.9 ANXIETY: Status: ACTIVE | Noted: 2021-12-01

## 2021-12-01 PROBLEM — F39 MOOD DISORDER (HCC): Status: ACTIVE | Noted: 2021-12-01

## 2021-12-02 ENCOUNTER — OFFICE VISIT (OUTPATIENT)
Dept: INTERNAL MEDICINE CLINIC | Age: 41
End: 2021-12-02
Payer: MEDICAID

## 2021-12-02 VITALS
RESPIRATION RATE: 18 BRPM | SYSTOLIC BLOOD PRESSURE: 136 MMHG | OXYGEN SATURATION: 95 % | DIASTOLIC BLOOD PRESSURE: 89 MMHG | BODY MASS INDEX: 33.12 KG/M2 | HEART RATE: 66 BPM | TEMPERATURE: 98.4 F | WEIGHT: 224.3 LBS

## 2021-12-02 DIAGNOSIS — F14.10: ICD-10-CM

## 2021-12-02 DIAGNOSIS — Z13.31 POSITIVE DEPRESSION SCREENING: ICD-10-CM

## 2021-12-02 DIAGNOSIS — F39 MOOD DISORDER (HCC): ICD-10-CM

## 2021-12-02 DIAGNOSIS — B18.2 CHRONIC HEPATITIS C WITHOUT HEPATIC COMA (HCC): ICD-10-CM

## 2021-12-02 DIAGNOSIS — M54.50 ACUTE LEFT-SIDED LOW BACK PAIN WITHOUT SCIATICA: ICD-10-CM

## 2021-12-02 DIAGNOSIS — R00.2 PALPITATIONS: Primary | ICD-10-CM

## 2021-12-02 PROCEDURE — 99214 OFFICE O/P EST MOD 30 MIN: CPT | Performed by: FAMILY MEDICINE

## 2021-12-02 RX ORDER — METHOCARBAMOL 500 MG/1
500 TABLET, FILM COATED ORAL 4 TIMES DAILY
Qty: 20 TABLET | Refills: 0 | Status: SHIPPED | OUTPATIENT
Start: 2021-12-02 | End: 2022-02-11 | Stop reason: ALTCHOICE

## 2021-12-02 RX ORDER — PROPRANOLOL HYDROCHLORIDE 20 MG/1
20 TABLET ORAL 2 TIMES DAILY
Qty: 60 TABLET | Refills: 2 | Status: SHIPPED | OUTPATIENT
Start: 2021-12-02 | End: 2022-03-09 | Stop reason: DRUGHIGH

## 2021-12-02 RX ORDER — BUPROPION HYDROCHLORIDE 150 MG/1
150 TABLET ORAL DAILY
Qty: 30 TABLET | Refills: 2 | Status: SHIPPED | OUTPATIENT
Start: 2021-12-02 | End: 2022-01-25

## 2021-12-02 RX ORDER — PREDNISONE 5 MG/1
TABLET ORAL
Qty: 21 TABLET | Refills: 0 | Status: SHIPPED | OUTPATIENT
Start: 2021-12-02 | End: 2022-02-11 | Stop reason: ALTCHOICE

## 2021-12-02 NOTE — PROGRESS NOTES
Chief Complaint   Patient presents with    Anxiety     medication for anxiety isn't working, would like a mood stabilizer    LOW BACK PAIN     1. Have you been to the ER, urgent care clinic since your last visit? Hospitalized since your last visit? No    2. Have you seen or consulted any other health care providers outside of the [address] [place] [address] since your last visit? Include any pap smears or colon screening.  No  Visit Vitals  /89   Pulse 66   Temp 98.4 °F (36.9 °C) (Oral)   Resp 18   Wt 224 lb 4.8 oz (101.7 kg)   SpO2 95%   BMI 33.12 kg/m²       Depression screen positive,  , C-SSRS completed
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
## 2021-12-02 NOTE — PATIENT INSTRUCTIONS
Beta-Blockers: Care Instructions  Your Care Instructions     Beta-blockers are used to lower blood pressure, treat heart failure and heart rhythm problems, and relieve angina symptoms, such as chest pain or pressure. And they decrease the chance of a second heart attack in someone who has already had a heart attack. They also slow the heart rate and reduce strain on the heart muscle and blood vessels. Most people do not have any side effects from beta-blockers. In rare cases, they can make asthma worse or make you feel tired. In some people, heart rate or blood pressure can drop too low. You may feel lightheaded. This may happen if you stand up quickly. It usually gets better with time. Before you start to take this medicine, make sure your doctor knows if you have severe asthma or frequent asthma attacks. Examples include:  · Atenolol (Tenormin). · Labetalol (Trandate). · Metoprolol (Lopressor, Toprol). · Propranolol (Inderal). Follow-up care is a key part of your treatment and safety. Be sure to make and go to all appointments, and call your doctor if you are having problems. It's also a good idea to know your test results and keep a list of the medicines you take. How can you care for yourself at home? · Take your medicines exactly as prescribed. Be sure to take high blood pressure medicines every day. Since high blood pressure often has no symptoms, it is easy to forget to take the pills. Call your doctor if you think you are having a problem with your medicine. · Always tell your doctor if you think you are having a side effect from your medicine. Stopping suddenly may make chest pains worse or cause your blood pressure to go up. Or it can cause other symptoms. If side effects are a problem with one medicine, you can try a different one. · Check with your doctor before you use any over-the-counter medicines. Beta-blockers can interact with other medicines.  Make sure your doctor knows all of the medicines, vitamins, herbal products, and supplements you take. · Some people feel tired when they take beta-blockers. If you exercise, you may tire more easily. If beta-blockers make it very hard for you to exercise, talk to your doctor. When should you call for help? Watch closely for changes in your health, and be sure to contact your doctor if:    · You have any problems with your medicine. Where can you learn more? Go to http://www.gray.com/  Enter Q208 in the search box to learn more about \"Beta-Blockers: Care Instructions. \"  Current as of: April 29, 2021               Content Version: 13.0  © 7749-4708 [employer]. Care instructions adapted under license by [employer] (which disclaims liability or warranty for this information). If you have questions about a medical condition or this instruction, always ask your healthcare professional. [place] [address] any warranty or liability for your use of this information. Methocarbamol (Robaxin, Robaxin-750) - (By mouth)   Why this medicine is used:   Treats muscle pain and spasms. Contact a nurse or doctor right away if you have:  · Slow heartbeat, seizure  · Dark urine or pale stools, nausea, vomiting, loss of appetite, stomach pain, yellow skin or eyes  · Unusual bleeding, bruising, or weakness  · Blurred vision, redness, pain, or swelling of the eyes, fever  · Lightheadedness, dizziness, fainting     Common side effects:  · Confusion, trouble sleeping, headache  · Warmth or redness in your face, neck, arms, or upper chest  © 2017 [facility] Information is for End User's use only and may not be sold, redistributed or otherwise used for commercial purposes.

## 2021-12-03 LAB — SPECIMEN STATUS REPORT, ROLRST: NORMAL

## 2021-12-05 LAB
HBV CORE AB SERPL QL IA: NEGATIVE
HBV CORE IGM SERPL QL IA: NEGATIVE
HBV SURFACE AG SERPL QL IA: NEGATIVE
HCV GENOTYPE: NORMAL
HCV GENTYP SERPL NAA+PROBE: 3
HCV RNA SERPL NAA+PROBE-ACNC: NORMAL IU/ML
HCV RNA SERPL NAA+PROBE-LOG IU: 6 LOG10 IU/ML
HIV 1+2 AB+HIV1 P24 AG SERPL QL IA: NON REACTIVE
PLEASE NOTE, 550474: NORMAL
TEST INFORMATION: NORMAL

## 2022-01-06 ENCOUNTER — VIRTUAL VISIT (OUTPATIENT)
Dept: INTERNAL MEDICINE CLINIC | Age: 42
End: 2022-01-06
Payer: MEDICAID

## 2022-01-06 DIAGNOSIS — F32.A ANXIETY AND DEPRESSION: Primary | ICD-10-CM

## 2022-01-06 DIAGNOSIS — E04.1 THYROID NODULE: ICD-10-CM

## 2022-01-06 DIAGNOSIS — F41.0 PANIC ATTACKS: ICD-10-CM

## 2022-01-06 DIAGNOSIS — Z79.899 PRESCRIPTION MEDICATION STARTED: ICD-10-CM

## 2022-01-06 DIAGNOSIS — F41.9 ANXIETY AND DEPRESSION: Primary | ICD-10-CM

## 2022-01-06 DIAGNOSIS — Z79.899 ENCOUNTER FOR LONG-TERM (CURRENT) USE OF OTHER MEDICATIONS: ICD-10-CM

## 2022-01-06 DIAGNOSIS — K21.9 GASTROESOPHAGEAL REFLUX DISEASE WITHOUT ESOPHAGITIS: ICD-10-CM

## 2022-01-06 PROCEDURE — 99213 OFFICE O/P EST LOW 20 MIN: CPT | Performed by: FAMILY MEDICINE

## 2022-01-06 RX ORDER — DIAZEPAM 5 MG/1
5 TABLET ORAL
Qty: 15 TABLET | Refills: 0 | Status: SHIPPED | OUTPATIENT
Start: 2022-01-06 | End: 2022-01-25 | Stop reason: SDUPTHER

## 2022-01-06 NOTE — PROGRESS NOTES
Chief Complaint   Patient presents with    Follow-up     4 week    Anxiety    Medication Evaluation     1. Have you been to the ER, urgent care clinic since your last visit? Hospitalized since your last visit? No    2. Have you seen or consulted any other health care providers outside of the [address] [place] [address] since your last visit? Include any pap smears or colon screening.  No

## 2022-01-07 NOTE — PROGRESS NOTES
Paresh Sykes (: 1980) is a 37 y.o. female, established patient, here for evaluation of the following chief complaint(s):   Follow-up (4 week), Anxiety, and Medication Evaluation       ASSESSMENT/PLAN:  Below is the assessment and plan developed based on review of pertinent history, labs, studies, and medications. 1. Anxiety and depression  -     diazePAM (Valium) 5 mg tablet; Take 1 Tablet by mouth every six (6) hours as needed for Anxiety. Max Daily Amount: 20 mg., Normal, Disp-15 Tablet, R-0  2. Panic attacks  3. Thyroid nodule  4. Gastroesophageal reflux disease without esophagitis    Continue to treat patient with Wellbutrin and propanolol. Add low-dose diazepam for short-term use as needed. Patient will follow-up with psychiatry. Patient will follow-up with endocrine for thyroid nodule management. Patient will continue omeprazole and reflux precautions for GERD. I have discussed the diagnosis with the patient and the intended plan as seen in the  orders above. The patient understands and agrees with the plan. All questions the patient posed were answered. Patient labs and/or xrays were reviewed  Past records were reviewed. Advised patient to call back or return to office if symptoms worsen/change/persist.  Discussed expected course/resolution/complications of diagnosis in detail with patient. Medication risks/benefits/costs/interactions/alternatives considered  No follow-ups on file. SUBJECTIVE/OBJECTIVE:  HPI  Patient is for follow-up of medications for anxiety and depression. She also has a thyroid nodule and endocrine appointment. Anxiety is \"bad\" on occasion, about 2-3 times a week. She is taking Wellbutrin. She is using propanolol 20 mg daily. She felt dizzy when she took an extra half tablet of propanolol.   Patient is concerned that her thyroid nodule might be causing episodes of anxiety but I discussed with her that her thyroid function tests were normal on 2 occasions. She was initiated on propanolol thinking that thyroid function could be responsible for anxiety episodes at least in part. Diazepam has been helpful in the past.  Psychiatry visit is 1/25/2022. [facility] Patient admits that depression has improved on Wellbutrin. GERD is stable on omeprazole. Review of Systems   Ros +anxiety    No data recorded     Physical Exam    [INSTRUCTIONS:  \"[x]\" Indicates a positive item  \"[]\" Indicates a negative item  -- DELETE ALL ITEMS NOT EXAMINED]    Constitutional: [x] Appears well-developed and well-nourished [x] No apparent distress      [] Abnormal -     Mental status: [x] Alert and awake  [x] Oriented to person/place/time [x] Able to follow commands    [] Abnormal -     Eyes:   EOM    [x]  Normal    [] Abnormal -   Sclera  [x]  Normal    [] Abnormal -          Discharge [x]  None visible   [] Abnormal -     HENT: [x] Normocephalic, atraumatic  [] Abnormal -   [x] Mouth/Throat: Mucous membranes are moist    External Ears [x] Normal  [] Abnormal -    Neck: [x] No visualized mass [] Abnormal -     Pulmonary/Chest: [x] Respiratory effort normal   [x] No visualized signs of difficulty breathing or respiratory distress        [] Abnormal -      Musculoskeletal:   [] Normal gait with no signs of ataxia         [x] Normal range of motion of neck        [] Abnormal -     Neurological:        [x] No Facial Asymmetry (Cranial nerve 7 motor function) (limited exam due to video visit)          [x] No gaze palsy        [] Abnormal -          Skin:        [x] No significant exanthematous lesions or discoloration noted on facial skin         [] Abnormal -            Psychiatric:       [x] Normal Affect [] Abnormal -        [x] No Hallucinations    Other pertinent observable physical exam findings:-            Jaimee Matthews, was evaluated through a synchronous (real-time) audio-video encounter.  The patient (or guardian if applicable) is aware that this is a billable service. Verbal consent to proceed has been obtained within the past 12 months. The visit was conducted pursuant to the emergency declaration under the [address] [place] [address] waiver authority and the [employer] Act. Patient identification was verified, and a caregiver was present when appropriate. The patient was located in a state where the provider was credentialed to provide care. An electronic signature was used to authenticate this note.   -- Sarah Flower MD

## 2022-01-25 ENCOUNTER — OFFICE VISIT (OUTPATIENT)
Dept: BEHAVIORAL/MENTAL HEALTH CLINIC | Age: 42
End: 2022-01-25
Payer: MEDICAID

## 2022-01-25 VITALS
SYSTOLIC BLOOD PRESSURE: 150 MMHG | TEMPERATURE: 97.2 F | BODY MASS INDEX: 33.24 KG/M2 | OXYGEN SATURATION: 99 % | HEIGHT: 69 IN | WEIGHT: 224.4 LBS | DIASTOLIC BLOOD PRESSURE: 98 MMHG | HEART RATE: 76 BPM

## 2022-01-25 DIAGNOSIS — F41.9 ANXIETY AND DEPRESSION: ICD-10-CM

## 2022-01-25 DIAGNOSIS — F39 MOOD DISORDER (HCC): ICD-10-CM

## 2022-01-25 DIAGNOSIS — F31.9 BIPOLAR I DISORDER (HCC): Primary | ICD-10-CM

## 2022-01-25 DIAGNOSIS — F32.A ANXIETY AND DEPRESSION: ICD-10-CM

## 2022-01-25 PROCEDURE — 99204 OFFICE O/P NEW MOD 45 MIN: CPT | Performed by: NURSE PRACTITIONER

## 2022-01-25 RX ORDER — DIAZEPAM 5 MG/1
5 TABLET ORAL
Qty: 15 TABLET | Refills: 0 | Status: SHIPPED | OUTPATIENT
Start: 2022-01-25 | End: 2022-02-15 | Stop reason: SDUPTHER

## 2022-01-25 RX ORDER — ARIPIPRAZOLE 2 MG/1
2 TABLET ORAL DAILY
Qty: 30 TABLET | Refills: 0 | Status: SHIPPED | OUTPATIENT
Start: 2022-01-25 | End: 2022-02-15 | Stop reason: SDUPTHER

## 2022-01-25 RX ORDER — BUPROPION HYDROCHLORIDE 150 MG/1
300 TABLET ORAL DAILY
Qty: 60 TABLET | Refills: 0 | Status: SHIPPED | OUTPATIENT
Start: 2022-01-25 | End: 2022-02-15 | Stop reason: SDUPTHER

## 2022-01-25 NOTE — PATIENT INSTRUCTIONS
[address] for support     Resources:    The Body Keeps The Score by Guillermo Maradiaga Sigifredo Miguel Andrade     It Did Start with Scotty Mathew to [facility]

## 2022-01-25 NOTE — PROGRESS NOTES
Chief Complaint   Patient presents with   [facility] Patient     Establish Care     Visit Vitals  BP (!) 150/98 (BP 1 Location: Left arm, BP Patient Position: Sitting, BP Cuff Size: Large adult)   Pulse 76   Temp 97.2 °F (36.2 °C) (Temporal)   Ht 5' 9\" (1.753 m)   Wt 101.8 kg (224 lb 6.4 oz)   SpO2 99%   BMI 33.14 kg/m²     Prior to Admission medications    Medication Sig Start Date End Date Taking? Authorizing Provider   diazePAM (Valium) 5 mg tablet Take 1 Tablet by mouth every six (6) hours as needed for Anxiety. Max Daily Amount: 20 mg. 1/6/22  Yes José Luis Abarca MD   buPROPion XL (WELLBUTRIN XL) 150 mg tablet Take 1 Tablet by mouth daily. 12/2/21  Yes José Luis Abarca MD   propranoloL (INDERAL) 20 mg tablet Take 1 Tablet by mouth two (2) times a day. Indicated for episodes of anxiety and palpitations. Dose can be adjusted up as needed 12/2/21  Yes José Luis Abarca MD   methocarbamoL (ROBAXIN) 500 mg tablet Take 1 Tablet by mouth four (4) times daily. 12/2/21  Yes José Luis Abarca MD   Omeprazole delayed release (PRILOSEC D/R) 20 mg tablet Take 1 Tablet by mouth daily.  11/16/21  Yes José Luis Abarca MD   predniSONE (STERAPRED) 5 mg dose pack See administration instruction per 5mg dose pack  Patient not taking: Reported on 1/25/2022 12/2/21   José Luis Abarca MD
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
## 2022-02-11 ENCOUNTER — OFFICE VISIT (OUTPATIENT)
Dept: OBGYN CLINIC | Age: 42
End: 2022-02-11
Payer: COMMERCIAL

## 2022-02-11 VITALS — BODY MASS INDEX: 32.49 KG/M2 | DIASTOLIC BLOOD PRESSURE: 74 MMHG | WEIGHT: 220 LBS | SYSTOLIC BLOOD PRESSURE: 134 MMHG

## 2022-02-11 DIAGNOSIS — Z01.419 WELL WOMAN EXAM WITH ROUTINE GYNECOLOGICAL EXAM: Primary | ICD-10-CM

## 2022-02-11 PROCEDURE — 99386 PREV VISIT NEW AGE 40-64: CPT | Performed by: OBSTETRICS & GYNECOLOGY

## 2022-02-11 NOTE — PATIENT INSTRUCTIONS
Well Visit, Ages 25 to 48: Care Instructions  Overview     Well visits can help you stay healthy. Your doctor has checked your overall health and may have suggested ways to take good care of yourself. Your doctor also may have recommended tests. At home, you can help prevent illness with healthy eating, regular exercise, and other steps. Follow-up care is a key part of your treatment and safety. Be sure to make and go to all appointments, and call your doctor if you are having problems. It's also a good idea to know your test results and keep a list of the medicines you take. How can you care for yourself at home? · Get screening tests that you and your doctor decide on. Screening helps find diseases before any symptoms appear. · Eat healthy foods. Choose fruits, vegetables, whole grains, protein, and low-fat dairy foods. Limit fat, especially saturated fat. Reduce salt in your diet. · Limit alcohol. If you are a man, have no more than 2 drinks a day or 14 drinks a week. If you are a woman, have no more than 1 drink a day or 7 drinks a week. · Get at least 30 minutes of physical activity on most days of the week. Walking is a good choice. You also may want to do other activities, such as running, swimming, cycling, or playing tennis or team sports. Discuss any changes in your exercise program with your doctor. · Reach and stay at a healthy weight. This will lower your risk for many problems, such as obesity, diabetes, heart disease, and high blood pressure. · Do not smoke or allow others to smoke around you. If you need help quitting, talk to your doctor about stop-smoking programs and medicines. These can increase your chances of quitting for good. · Care for your mental health. It is easy to get weighed down by worry and stress. Learn strategies to manage stress, like deep breathing and mindfulness, and stay connected with your family and community.  If you find you often feel sad or hopeless, talk with your doctor. Treatment can help. · Talk to your doctor about whether you have any risk factors for sexually transmitted infections (STIs). You can help prevent STIs if you wait to have sex with a new partner (or partners) until you've each been tested for STIs. It also helps if you use condoms (male or female condoms) and if you limit your sex partners to one person who only has sex with you. Vaccines are available for some STIs, such as HPV. · Use birth control if it's important to you to prevent pregnancy. Talk with your doctor about the choices available and what might be best for you. · If you think you may have a problem with alcohol or drug use, talk to your doctor. This includes prescription medicines (such as amphetamines and opioids) and illegal drugs (such as cocaine and methamphetamine). Your doctor can help you figure out what type of treatment is best for you. · Protect your skin from too much sun. When you're outdoors from 10 a.m. to 4 p.m., stay in the shade or cover up with clothing and a hat with a wide brim. Wear sunglasses that block UV rays. Even when it's cloudy, put broad-spectrum sunscreen (SPF 30 or higher) on any exposed skin. · See a dentist one or two times a year for checkups and to have your teeth cleaned. · Wear a seat belt in the car. When should you call for help? Watch closely for changes in your health, and be sure to contact your doctor if you have any problems or symptoms that concern you. Where can you learn more? Go to http://www.[employer].com/  Enter P072 in the search box to learn more about \"Well Visit, Ages 25 to 48: Care Instructions. \"  Current as of: February 11, 2021               Content Version: 13.0  © 2438-6049 [employer], Incorporated. Care instructions adapted under license by [employer] (which disclaims liability or warranty for this information).  If you have questions about a medical condition or this instruction, always ask your healthcare professional. [place] [address] any warranty or liability for your use of this information.
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
## 2022-02-15 ENCOUNTER — OFFICE VISIT (OUTPATIENT)
Dept: BEHAVIORAL/MENTAL HEALTH CLINIC | Age: 42
End: 2022-02-15

## 2022-02-15 VITALS
DIASTOLIC BLOOD PRESSURE: 82 MMHG | RESPIRATION RATE: 16 BRPM | HEIGHT: 69 IN | WEIGHT: 219.6 LBS | TEMPERATURE: 97.4 F | SYSTOLIC BLOOD PRESSURE: 130 MMHG | BODY MASS INDEX: 32.53 KG/M2

## 2022-02-15 DIAGNOSIS — F39 MOOD DISORDER (HCC): Primary | ICD-10-CM

## 2022-02-15 DIAGNOSIS — F41.9 ANXIETY AND DEPRESSION: ICD-10-CM

## 2022-02-15 DIAGNOSIS — F32.A ANXIETY AND DEPRESSION: ICD-10-CM

## 2022-02-15 PROCEDURE — 99214 OFFICE O/P EST MOD 30 MIN: CPT | Performed by: NURSE PRACTITIONER

## 2022-02-15 RX ORDER — BUPROPION HYDROCHLORIDE 150 MG/1
300 TABLET ORAL DAILY
Qty: 60 TABLET | Refills: 1 | Status: SHIPPED | OUTPATIENT
Start: 2022-02-15 | End: 2022-03-09 | Stop reason: SDUPTHER

## 2022-02-15 RX ORDER — DIAZEPAM 5 MG/1
5 TABLET ORAL
Qty: 45 TABLET | Refills: 0 | Status: SHIPPED | OUTPATIENT
Start: 2022-02-15 | End: 2022-03-09 | Stop reason: SDUPTHER

## 2022-02-15 RX ORDER — ARIPIPRAZOLE 2 MG/1
2 TABLET ORAL DAILY
Qty: 30 TABLET | Refills: 0 | Status: SHIPPED | OUTPATIENT
Start: 2022-02-15 | End: 2022-03-09 | Stop reason: SDUPTHER

## 2022-02-15 NOTE — PROGRESS NOTES
Chief Complaint   Patient presents with    Follow-up       Visit Vitals  /82 (BP 1 Location: Right arm, BP Patient Position: Sitting, BP Cuff Size: Large adult)   Temp 97.4 °F (36.3 °C) (Temporal)   Resp 16   Ht 5' 9\" (1.753 m)   Wt 99.6 kg (219 lb 9.6 oz)   BMI 32.43 kg/m²       Prior to Admission medications    Medication Sig Start Date End Date Taking? Authorizing Provider   diazePAM (Valium) 5 mg tablet Take 1 Tablet by mouth every six (6) hours as needed for Anxiety. Max Daily Amount: 20 mg. 1/25/22  Yes Kiesha Gallego NP   buPROPion XL (WELLBUTRIN XL) 150 mg tablet Take 2 Tablets by mouth daily for 30 days. 1/25/22 2/24/22 Yes Kiesha Gallego NP   ARIPiprazole (ABILIFY) 2 mg tablet Take 1 Tablet by mouth daily for 30 days. 1/25/22 2/24/22 Yes Ruiz Gallego NP   propranoloL (INDERAL) 20 mg tablet Take 1 Tablet by mouth two (2) times a day. Indicated for episodes of anxiety and palpitations. Dose can be adjusted up as needed 12/2/21  Yes Rohit Morrow MD   Omeprazole delayed release (PRILOSEC D/R) 20 mg tablet Take 1 Tablet by mouth daily. 11/16/21  Yes Rohit Morrow MD           Patient states since starting on Abilify she has lost weight. Her appetite is reduced. Patient states anxiety has not changed.

## 2022-02-15 NOTE — PROGRESS NOTES
CHIEF COMPLAINT:  Ry Deng is a 37 y.o. female and was seen today for follow-up of psychiatric condition and psychotropic medication management. HPI:    [facility] reports the following psychiatric symptoms by hx:  depression and anxiety. Overall symptoms have been present for years. Currently symptoms are  of moderate severity. The symptoms occur contstantly. Pt reports medications are effective. Met with pt  for appt today or to review current treatment plan. FAMILY/SOCIAL HX:   Psychosocial stressors    REVIEW OF SYSTEMS:  Psychiatric symptoms being monitored for:  depression, anxiety   Appetite:decreased   Sleep: improved   Neuro: Denies     Visit Vitals  /82 (BP 1 Location: Right arm, BP Patient Position: Sitting, BP Cuff Size: Large adult)   Temp 97.4 °F (36.3 °C) (Temporal)   Resp 16   Ht 5' 9\" (1.753 m)   Wt 99.6 kg (219 lb 9.6 oz)   LMP 02/04/2022   BMI 32.43 kg/m²       Side Effects:  patient reports feeling \"spacey\"     MENTAL STATUS EXAM:   Sensorium  oriented to time, place and person   Relations cooperative   Appearance:  within normal Limits   Motor Behavior:  within normal limits   Speech:  normal pitch and normal volume   Thought Process: within normal limits   Thought Content free of delusions and free of hallucinations   Suicidal ideations none   Homicidal ideations none   Mood:  anxiety   Affect:  euthymic   Memory recent  adequate   Memory remote:  adequate   Concentration:  impaired   Abstraction:  abstract   Insight:  good   Reliability good   Judgment:  good     MEDICAL DECISION MAKING:  Problems addressed today:    ICD-10-CM ICD-9-CM    1. Mood disorder (HCC)  F39 296.90 REFERRAL TO NEUROLOGY      buPROPion XL (WELLBUTRIN XL) 150 mg tablet      ARIPiprazole (ABILIFY) 2 mg tablet   2. Anxiety and depression  F41.9 300.00 diazePAM (Valium) 5 mg tablet    F32. A 311          Assessment:   [facility] is responding to treatment.  Symptoms are less in severity and less in occurrence, but patient continues to have breakthrough anxiety symptoms. She also reports not being able to focus on anything. Discussed current medications and dosages. Discussed alternative treatment plan, discussed Gene sight testing, and discussed neuropsych testing referral placed. Discussed positive coping strategies. Encouraged patient to continue self care. Reviewed treatment goals and target symptoms to monitor for. Patient is doing well overall. Denies SI/HI/AH/VH and delusions. Plan:   1. Current Outpatient Medications   Medication Sig Dispense Refill    diazePAM (Valium) 5 mg tablet Take 1 Tablet by mouth every twelve (12) hours as needed for Anxiety. Max Daily Amount: 10 mg. 45 Tablet 0    buPROPion XL (WELLBUTRIN XL) 150 mg tablet Take 2 Tablets by mouth daily. 60 Tablet 1    ARIPiprazole (ABILIFY) 2 mg tablet Take 1 Tablet by mouth daily for 30 days. 30 Tablet 0    propranoloL (INDERAL) 20 mg tablet Take 1 Tablet by mouth two (2) times a day. Indicated for episodes of anxiety and palpitations. Dose can be adjusted up as needed 60 Tablet 2    Omeprazole delayed release (PRILOSEC D/R) 20 mg tablet Take 1 Tablet by mouth daily. 30 Tablet 2          medication changes made today: cont medications as prescribed. Valium 5 mg BID as needed only for anxiety    2. Counseling and coordination of care including instructions for treatment, risks/benefits, risk factor reduction and patient/family education. She agrees with the plan. Patient instructed to call with any side effects, questions or issues. 3.    Follow-up and Dispositions    · Return in about 3 weeks (around 3/8/2022).               2/15/2022  Koffi Wood NP

## 2022-02-16 LAB
CYTOLOGIST CVX/VAG CYTO: NORMAL
CYTOLOGY CVX/VAG DOC CYTO: NORMAL
CYTOLOGY CVX/VAG DOC THIN PREP: NORMAL
DX ICD CODE: NORMAL
LABCORP, 190119: NORMAL
Lab: NORMAL
Lab: NORMAL
OTHER STN SPEC: NORMAL
STAT OF ADQ CVX/VAG CYTO-IMP: NORMAL

## 2022-02-22 ENCOUNTER — OFFICE VISIT (OUTPATIENT)
Dept: INTERNAL MEDICINE CLINIC | Age: 42
End: 2022-02-22
Payer: COMMERCIAL

## 2022-02-22 ENCOUNTER — DOCUMENTATION ONLY (OUTPATIENT)
Dept: ENDOCRINOLOGY | Age: 42
End: 2022-02-22

## 2022-02-22 VITALS
HEART RATE: 61 BPM | WEIGHT: 220.3 LBS | RESPIRATION RATE: 18 BRPM | BODY MASS INDEX: 32.63 KG/M2 | SYSTOLIC BLOOD PRESSURE: 110 MMHG | DIASTOLIC BLOOD PRESSURE: 77 MMHG | OXYGEN SATURATION: 98 % | TEMPERATURE: 98.2 F | HEIGHT: 69 IN

## 2022-02-22 DIAGNOSIS — Z79.899 MEDICATION THERAPY CHANGED: ICD-10-CM

## 2022-02-22 DIAGNOSIS — M54.2 NECK DISCOMFORT: Primary | ICD-10-CM

## 2022-02-22 DIAGNOSIS — E01.0 THYROMEGALY: ICD-10-CM

## 2022-02-22 DIAGNOSIS — K21.9 GASTROESOPHAGEAL REFLUX DISEASE, UNSPECIFIED WHETHER ESOPHAGITIS PRESENT: ICD-10-CM

## 2022-02-22 DIAGNOSIS — Z79.899 ENCOUNTER FOR LONG-TERM (CURRENT) USE OF OTHER MEDICATIONS: ICD-10-CM

## 2022-02-22 PROCEDURE — 99214 OFFICE O/P EST MOD 30 MIN: CPT | Performed by: FAMILY MEDICINE

## 2022-02-22 RX ORDER — PANTOPRAZOLE SODIUM 40 MG/1
40 TABLET, DELAYED RELEASE ORAL DAILY
Qty: 30 TABLET | Refills: 2 | Status: SHIPPED | OUTPATIENT
Start: 2022-02-22

## 2022-02-22 NOTE — PROGRESS NOTES
Hey there,     Since there's tracheal/esophageal deviation, do you mind asking her to call Dr. Yvrose Parker the endocrine surgeon: You can see Dr. Yvrose Parker for evaluation for parathyroid/thyroid surgery.  Her phone number is [phone].  ([place])     (this CT was from Sept 2021)  THYROID GLAND: Large heterogeneous nodular left thyroid lobe measures 3.8 x 3.4  x 9.2 cm slightly deviating the trachea and esophagus rightward. Right thyroid  lobe is normal in size without nodule. I won't be able to do anything about this aside from send her to surgery myself. JL            ===View-only below this line===  ----- Message -----  From: Honey Leach MD  Sent: 2/22/2022   2:03 PM EST  To: Louis Barger MD  Subject: appointment, new patient                         Dr Radha Goodman,    Patient has a large thyroid with significant progressive compression symptoms. If you have an open slot prior to her appointment on April 8,2022 please reach out to the office and /or patient.       Kind regards,   Cesar Carter MD
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
## 2022-02-22 NOTE — PROGRESS NOTES
Chief Complaint   Patient presents with    Medication Evaluation     omeprazole is not helping at all    Neck Swelling     neck has been swelling & making it hard to breath recently     1. Have you been to the ER, urgent care clinic since your last visit? Hospitalized since your last visit? No    2. Have you seen or consulted any other health care providers outside of the [address] [place] [address] since your last visit? Include any pap smears or colon screening.  No  Visit Vitals  /77   Pulse 61   Temp 98.2 °F (36.8 °C) (Oral)   Resp 18   Ht 5' 9\" (1.753 m)   Wt 220 lb 4.8 oz (99.9 kg)   SpO2 98%   BMI 32.53 kg/m²

## 2022-02-23 DIAGNOSIS — J39.8 TRACHEAL DEVIATION: ICD-10-CM

## 2022-02-23 DIAGNOSIS — E01.0 THYROMEGALY: Primary | ICD-10-CM

## 2022-03-04 ENCOUNTER — OFFICE VISIT (OUTPATIENT)
Dept: HEMATOLOGY | Age: 42
End: 2022-03-04
Payer: MEDICARE

## 2022-03-04 VITALS
HEIGHT: 69 IN | HEART RATE: 50 BPM | BODY MASS INDEX: 32.29 KG/M2 | WEIGHT: 218 LBS | TEMPERATURE: 97.4 F | SYSTOLIC BLOOD PRESSURE: 120 MMHG | DIASTOLIC BLOOD PRESSURE: 77 MMHG

## 2022-03-04 DIAGNOSIS — B18.2 CHRONIC HEPATITIS C WITHOUT HEPATIC COMA (HCC): Primary | ICD-10-CM

## 2022-03-04 PROBLEM — B19.20 HEPATITIS C: Status: ACTIVE | Noted: 2019-01-01

## 2022-03-04 PROBLEM — E04.9 GOITER: Status: ACTIVE | Noted: 2022-03-04

## 2022-03-04 PROBLEM — F31.9 BIPOLAR 1 DISORDER (HCC): Status: ACTIVE | Noted: 2019-01-01

## 2022-03-04 PROCEDURE — 99203 OFFICE O/P NEW LOW 30 MIN: CPT | Performed by: INTERNAL MEDICINE

## 2022-03-04 PROCEDURE — G8417 CALC BMI ABV UP PARAM F/U: HCPCS | Performed by: INTERNAL MEDICINE

## 2022-03-04 PROCEDURE — G8427 DOCREV CUR MEDS BY ELIG CLIN: HCPCS | Performed by: INTERNAL MEDICINE

## 2022-03-04 PROCEDURE — G9717 DOC PT DX DEP/BP F/U NT REQ: HCPCS | Performed by: INTERNAL MEDICINE

## 2022-03-04 NOTE — PROGRESS NOTES
[address], MD, [address], [place], [place]      URVASHI Krishna, [facility]     Gracie Parrish, [facility]   CUONG San-MARLENE Gloria, [facility]       Dariusz Deleon [facility] 136    at [address], [address] [place] [address], [facility] 22.    [phone]    FAX: [address] [place] [address] Avenue    at [address], [address] [place] [address],[address]    [address] [facility] [address]: [phone]           Patient Care Team:  Amber Mayfield MD as PCP - General (Family Medicine)  Amber Mayfield MD as PCP - [facility] Provider      Problem List  Date Reviewed: 2022          Codes Class Noted    Thyroid nodule ICD-10-CM: E04.1  ICD-9-CM: 241.0  Unknown        Goiter ICD-10-CM: E04.9  ICD-9-CM: 240.9  3/4/2022        GERD (gastroesophageal reflux disease) ICD-10-CM: K21.9  ICD-9-CM: 530.81  Unknown        Anxiety ICD-10-CM: F41.9  ICD-9-CM: 300.00  2021        Chronic hepatitis C ([facility] ICD-10-CM: B18.2  ICD-9-CM: 070.54          Bipolar 1 disorder ([facility] ICD-10-CM: F31.9  ICD-9-CM: 296.7                The clinicians listed above have asked me to see Fahad Valdez in consultation regarding chronic HCV and its management. All medical records sent by the referring physicians were reviewed including imaging studies. The patient is a 37 y.o.  female who was found to have abnormalities in liver chemistries and subsequently tested positive for chronic HCV in 2021. Risk factors for acquiring HCV are IV drug use and sharing a needle with her sister who was known to have HCV and subsequently  of a drug overdose. The patients duration of drug use was from in  - .       There was no history of acute icteric hepatitis at the time of these risk factors. Ultrasoundof the liver was performed in 3/2022. The results of the imaging suggested chronic liver disease or fatty liver disease. An assessment of liver fibrosis with biopsy or elastography has not been performed. The patient has never received treatment for chronic HCV. The patient does not have any symptoms which could be attributed to the liver disorder. The patient is not experiencing the following symptoms which are commonly seen in this liver disorder:   fatigue,   pain in the right side over the liver,     The patient completes all daily activities without any functional limitations. ASSESSMENT AND PLAN:  Chronic HCV   Chronic HCV of unclear severity. Have performed laboratory testing to monitor liver function and degree of liver injury. This included BMP, hepatic panel, CBC with platelet count, INR. Liver transaminases are elevated. ALP is normal.  Liver function is normal.  The platelet count is normal.      Based upon laboratory studies and imaging the patient does not appear to have advanced liver disease. Will perform and/or review results of HCV viral load, HCV genotype to define the specific treatment and duration of treatment that will be required. Will perform  serologic and virologic studies to assess for other causes of chronic liver disease. Will perform imaging of the liver with ultrasound. The need to perform an assessment of liver fibrosis was discussed with the patient. The Fibroscan can assess liver fibrosis and determine if a patient has advanced fibrosis or cirrhosis without the need for liver biopsy. This will be performed at the next office visit. Chronic HCV Treatment  The patient has not been treated for HCV. The patient has HCV genotype 3. The patient should be treated with Lynford Patella (glecaprevir and piprentasvir) o Epclusa (sofosbuvir and velpatasvir) since she does not appear to have cirhrosis.   IF she has cirrhosis the treatment should be with Mavyret. The SVR/cure rate for is over 95%. Screening for Hepatocellular Carcinoma  HCC screening is not necessary if the patient has no evidence of cirrhosis. Treatment of other medical problems in patients with chronic liver disease  There are no contraindications for the patient to take most medications that are necessary for treatment of other medical issues. Counseling for alcohol in patients with chronic liver disease  The patient was counseled regarding alcohol consumption and the effect of alcohol on chronic liver disease. The patient does not consume any significant amount of alcohol. Substance Use  The patient was counseled regarding the risk of overdose and death from using opioids and other narcotic drugs. Discussed the risk of becoming reinfected with HCV once they are cured if they resume IV drug use or inhaling drugs nasally. The patient has not used drugs since 2021. Vaccinations   Vaccination for viral hepatitis B is not needed. The patient has serologic evidence of prior exposure or vaccination with immunity. Routine vaccinations against other bacterial and viral agents can be performed as indicated. Annual flu vaccination should be administered if indicated. ALLERGIES  No Known Allergies    MEDICATIONS  Current Outpatient Medications   Medication Sig    pantoprazole (PROTONIX) 40 mg tablet Take 1 Tablet by mouth daily.  diazePAM (Valium) 5 mg tablet Take 1 Tablet by mouth every twelve (12) hours as needed for Anxiety. Max Daily Amount: 10 mg.  propranoloL (INDERAL) 10 mg tablet TAKE 1 TABLET BY MOUTH TWO TIMES A DAY.  buPROPion XL (WELLBUTRIN XL) 150 mg tablet Take 2 Tablets by mouth daily.  ARIPiprazole (ABILIFY) 2 mg tablet Take 1 Tablet by mouth daily. No current facility-administered medications for this visit.        SYSTEM REVIEW NOT RELATED TO LIVER DISEASE OR REVIEWED ABOVE:  Constitution systems: Negative for fever, chills, weight gain, weight loss. Eyes: Negative for visual changes. ENT: Negative for sore throat, painful swallowing. Respiratory: Negative for cough, hemoptysis, SOB. Cardiology: Negative for chest pain, palpitations. GI:  Negative for constipation or diarrhea. : Negative for urinary frequency, dysuria, hematuria, nocturia. Skin: Negative for rash. Hematology: Negative for easy bruising, blood clots. Musculo-skelatal: Negative for back pain, muscle pain, weakness. Neurologic: Negative for headaches, dizziness, vertigo, memory problems not related to HE. Psychology: Negative for anxiety, depression. FAMILY HISTORY:  The father Has/had the following following chronic disease(s): alcoholism. The mother Has/had the following chronic disease(s): alcoholism. SOCIAL HISTORY:  The patient is . The spouse has not been tested for HCV   The patient has 5 children,   The patient has never used tobacco products. She vapes and smoke marijuana  The patient has never consumed significant amounts of alcohol. The patient does not work. PHYSICAL EXAMINATION:  Visit Vitals  /77 (BP 1 Location: Left upper arm, BP Patient Position: Sitting, BP Cuff Size: Adult)   Pulse (!) 50   Temp 97.4 °F (36.3 °C) (Temporal)   Ht 5' 9\" (1.753 m)   Wt 218 lb (98.9 kg)   LMP 02/04/2022   BMI 32.19 kg/m²     General: No acute distress. Eyes: Sclera anicteric. ENT: No oral lesions. Thyroid normal.  Nodes: No adenopathy. Skin: No spider angiomata. No jaundice. No palmar erythema. Respiratory: Lungs clear to auscultation. Cardiovascular: Regular heart rate. No murmurs. No JVD. Abdomen: Soft non-tender. Liver size normal to percussion/palpation. Spleen not palpable. No obvious ascites. Extremities: No edema. No muscle wasting. No gross arthritic changes. Neurologic: Alert and oriented. Cranial nerves grossly intact. No asterixis.     LABORATORY STUDIES:  Liver [place] of [address] Units 4/12/2022 4/12/2022   WBC 3.4 - 10.8 x10E3/uL  8.5   ANC 1.4 - 7.0 x10E3/uL  4.5   HGB 11.1 - 15.9 g/dL  15.5    - 450 x10E3/uL  298   INR 0.9 - 1.1       AST 0 - 40 IU/L 78 (H) 78 (H)   ALT 0 - 32 IU/L 144 (H) 144 (H)   Alk Phos 44 - 121 IU/L 97 97   Bili, Total 0.0 - 1.2 mg/dL 1.1 1.1   Albumin 3.8 - 4.8 g/dL 4.4 4.4   BUN 6 - 24 mg/dL 10 10   Creat 0.57 - 1.00 mg/dL 0.86 0.86   Na 134 - 144 mmol/L 136 136   K 3.5 - 5.2 mmol/L 5.0 5.0   Cl 96 - 106 mmol/L 97 97   CO2 20 - 29 mmol/L 25 25   Glucose 65 - 99 mg/dL 81 81     SEROLOGIES:  Serologies Latest Ref Rng & Units 3/4/2022 12/2/2021   Hep A Ab, Total Negative   Negative    Hep B Surface Ag Negative  Negative   Hep B Core Ab, Total Negative  Negative   Hep B Surface Ab mIU/mL 10.05    Hep B Surface Ab Interp NONREACTIVE   REACTIVE (A)    Hep C Genotype   3   HCV RT-PCR, Quant IU/mL  1,010,000   HCV Log10 log10 IU/mL  6.004     LIVER HISTOLOGY:  Not available or performed    ENDOSCOPIC PROCEDURES:  Not available or performed    RADIOLOGY:  3/2022. Ultrasound of liver. Echogenic consistent with chronic liver disease. No liver mass lesions. No dilated bile ducts. No ascites. OTHER TESTING:  Not available or performed    FOLLOW-UP:  All of the issues listed above in the Assessment and Plan were discussed with the patient. All questions were answered. The patient expressed a clear understanding of the above. [address] [place] [address] in 4 weeks for Fibroscan to review all data and determine the treatment plan.       Sampson Veloz MD  [facility] of [address], [address] [facility] 22.  [phone]  [address] [facility]

## 2022-03-04 NOTE — PROGRESS NOTES
Identified pt with two pt identifiers(name and ). Reviewed record in preparation for visit and have obtained necessary documentation. Chief Complaint   Patient presents with    Hepatitis C     Establish care      Vitals:    22 1325   BP: 120/77   Pulse: (!) 50   Temp: 97.4 °F (36.3 °C)   TempSrc: Temporal   Weight: 218 lb (98.9 kg)   Height: 5' 9\" (1.753 m)   PainSc:   0 - No pain   LMP: 2022       Health Maintenance Review: Patient reminded of \"due or due soon\" health maintenance. I have asked the patient to contact his/her primary care provider (PCP) for follow-up on his/her health maintenance. Coordination of Care Questionnaire:  :   1) Have you been to an emergency room, urgent care, or hospitalized since your last visit? If yes, where when, and reason for visit? no       2. Have seen or consulted any other health care provider since your last visit? If yes, where when, and reason for visit? YES. ENT and PCP. Patient is accompanied by self I have received verbal consent from Zahraa to discuss any/all medical information while they are present in the room.

## 2022-03-04 NOTE — Clinical Note
4/16/2022    Patient: Ai Esteves   YOB: 1980   Date of Visit: 3/4/2022     Leopoldo Son, MD  [place] [address]  [address]    Dear Leopoldo Son, MD,      Thank you for [facility]. My notes for this consultation are attached. If you have questions, please do not hesitate to call me. I look forward to following your patient along with you.       Sincerely,    Dheeraj Corral MD

## 2022-03-05 LAB
HBV SURFACE AB SER QL: REACTIVE
HBV SURFACE AB SER-ACNC: 10.05 MIU/ML

## 2022-03-06 LAB — HAV AB SER QL IA: NEGATIVE

## 2022-03-08 LAB
A2 MACROGLOB SERPL-MCNC: 238 MG/DL (ref 110–276)
ALT (SGPT) P5P, 001547: 68 IU/L (ref 0–40)
APO A-I SERPL-MCNC: 155 MG/DL (ref 116–209)
BILIRUB SERPL-MCNC: 0.4 MG/DL (ref 0–1.2)
COMMENT , 550094: ABNORMAL
FIBROSIS SCORE, 550102, HCVF1: 0.15 (ref 0–0.21)
FIBROSIS SCORING:, 550107: ABNORMAL
FIBROSIS STAGE, 550132: ABNORMAL
GGT SERPL-CCNC: 34 IU/L (ref 0–60)
HAPTOGLOB SERPL-MCNC: 140 MG/DL (ref 33–278)
INTERPRETATION, 550106: ABNORMAL
LIMITATIONS, 550105: ABNORMAL
NECROINFLAMM ACTIVITY SCORING:, 550121: ABNORMAL
NECROINFLAMMAT ACTIVITY GRADE, 550133: ABNORMAL
NECROINFLAMMAT ACTIVITY SCORE, 550103: 0.35 (ref 0–0.17)

## 2022-03-09 ENCOUNTER — OFFICE VISIT (OUTPATIENT)
Dept: BEHAVIORAL/MENTAL HEALTH CLINIC | Age: 42
End: 2022-03-09
Payer: MEDICAID

## 2022-03-09 VITALS
WEIGHT: 214.8 LBS | TEMPERATURE: 97 F | RESPIRATION RATE: 17 BRPM | HEART RATE: 74 BPM | SYSTOLIC BLOOD PRESSURE: 118 MMHG | HEIGHT: 69 IN | BODY MASS INDEX: 31.81 KG/M2 | DIASTOLIC BLOOD PRESSURE: 68 MMHG | OXYGEN SATURATION: 98 %

## 2022-03-09 DIAGNOSIS — F41.9 ANXIETY: ICD-10-CM

## 2022-03-09 DIAGNOSIS — F32.A ANXIETY AND DEPRESSION: ICD-10-CM

## 2022-03-09 DIAGNOSIS — F31.9 BIPOLAR 1 DISORDER (HCC): Primary | ICD-10-CM

## 2022-03-09 DIAGNOSIS — F41.9 ANXIETY AND DEPRESSION: ICD-10-CM

## 2022-03-09 DIAGNOSIS — F39 MOOD DISORDER (HCC): ICD-10-CM

## 2022-03-09 PROCEDURE — 99214 OFFICE O/P EST MOD 30 MIN: CPT | Performed by: NURSE PRACTITIONER

## 2022-03-09 RX ORDER — PROPRANOLOL HYDROCHLORIDE 10 MG/1
10 TABLET ORAL 2 TIMES DAILY
Qty: 60 TABLET | Refills: 0 | Status: SHIPPED | OUTPATIENT
Start: 2022-03-09 | End: 2022-04-11

## 2022-03-09 RX ORDER — ARIPIPRAZOLE 2 MG/1
2 TABLET ORAL DAILY
Qty: 30 TABLET | Refills: 1 | Status: SHIPPED | OUTPATIENT
Start: 2022-03-09 | End: 2022-05-12 | Stop reason: SDUPTHER

## 2022-03-09 RX ORDER — DIAZEPAM 5 MG/1
5 TABLET ORAL
Qty: 60 TABLET | Refills: 0 | Status: SHIPPED | OUTPATIENT
Start: 2022-03-09 | End: 2022-04-12 | Stop reason: SDUPTHER

## 2022-03-09 RX ORDER — BUPROPION HYDROCHLORIDE 150 MG/1
300 TABLET ORAL DAILY
Qty: 60 TABLET | Refills: 1 | Status: SHIPPED | OUTPATIENT
Start: 2022-03-09 | End: 2022-05-12 | Stop reason: SDUPTHER

## 2022-03-09 NOTE — PROGRESS NOTES
CHIEF COMPLAINT:  Sirisha Puente is a 37 y.o. female and was seen today for follow-up of psychiatric condition and psychotropic medication management. HPI:    [facility] reports the following psychiatric symptoms by hx:  depression and anxiety. Overall symptoms have been present for years. Currently symptoms are  of moderate severity. The symptoms occur contantly. Pt reports medications are effective. Met with pt for appt today  to review current treatment plan. FAMILY/SOCIAL HX:   Psychosocial Stressors       REVIEW OF SYSTEMS:  Psychiatric symptoms being monitored for:  depression, ADHD, anxiety  Appetite:decreased   Sleep: improved   Neuro: denies   Endocrine: Thyroid nodules     Visit Vitals  /68 (BP 1 Location: Left arm, BP Patient Position: Sitting, BP Cuff Size: Large adult)   Pulse 74   Temp 97 °F (36.1 °C) (Temporal)   Resp 17   Ht 5' 9\" (1.753 m)   Wt 97.4 kg (214 lb 12.8 oz)   SpO2 98%   BMI 31.72 kg/m²       Side Effects:  lightheadedness     MENTAL STATUS EXAM:   Sensorium  oriented to time, place and person   Relations cooperative   Appearance:  within normal Limits   Motor Behavior:  within normal limits   Speech:  normal pitch and normal volume   Thought Process: within normal limits   Thought Content free of delusions and free of hallucinations   Suicidal ideations none   Homicidal ideations none   Mood:  anxious   Affect:  euthymic   Memory recent  adequate   Memory remote:  adequate   Concentration:  impaired   Abstraction:  abstract   Insight:  good   Reliability good   Judgment:  good     MEDICAL DECISION MAKING:  Problems addressed today:    ICD-10-CM ICD-9-CM    1. Bipolar 1 disorder (HCC)  F31.9 296.7    2. Anxiety  F41.9 300.00            Assessment:   [facility] is responding to treatment. Symptoms are improving and less in severity and less in occurrence. Depressive symptoms have significantly improved, but patient continues to struggle with anxiety.  Patient is going to the gym more, she is feeling much better, but believes anxiety is increased due to hyperthyroidism. Encouraged patient to continue self care. Will see endocrinology on April 8th. Patient also reports some lightheadedness with standing. Decreasing the propranolol to 10 mg as [facility] report indicates low doses may be needed. Discussed current medications and dosages. Reviewed treatment goals and target symptoms to monitor for. Patient denies SI/HI/AH/VH and delusions. Plan:   1. Current Outpatient Medications   Medication Sig Dispense Refill    pantoprazole (PROTONIX) 40 mg tablet Take 1 Tablet by mouth daily. 30 Tablet 2    diazePAM (Valium) 5 mg tablet Take 1 Tablet by mouth every twelve (12) hours as needed for Anxiety. Max Daily Amount: 10 mg. 45 Tablet 0    buPROPion XL (WELLBUTRIN XL) 150 mg tablet Take 2 Tablets by mouth daily. 60 Tablet 1    ARIPiprazole (ABILIFY) 2 mg tablet Take 1 Tablet by mouth daily for 30 days. 30 Tablet 0    propranoloL (INDERAL) 20 mg tablet Take 1 Tablet by mouth two (2) times a day. Indicated for episodes of anxiety and palpitations. Dose can be adjusted up as needed 60 Tablet 2          medication changes made today:  Propranolol 10 mg BID     2. Counseling and coordination of care including instructions for treatment, risks/benefits, risk factor reduction and patient/family education. She agrees with the plan. Patient instructed to call with any side effects, questions or issues.           3/9/2022  Venancio Reddy NP

## 2022-03-09 NOTE — PROGRESS NOTES
Chief Complaint   Patient presents with    Medication Management     Visit Vitals  /68 (BP 1 Location: Left arm, BP Patient Position: Sitting, BP Cuff Size: Large adult)   Pulse 74   Temp 97 °F (36.1 °C) (Temporal)   Resp 17   Ht 5' 9\" (1.753 m)   Wt 97.4 kg (214 lb 12.8 oz)   SpO2 98%   BMI 31.72 kg/m²     Prior to Admission medications    Medication Sig Start Date End Date Taking? Authorizing Provider   pantoprazole (PROTONIX) 40 mg tablet Take 1 Tablet by mouth daily. 2/22/22   Leslie Valiente MD   diazePAM (Valium) 5 mg tablet Take 1 Tablet by mouth every twelve (12) hours as needed for Anxiety. Max Daily Amount: 10 mg. 2/15/22   Sami Gallego Se, NP   buPROPion XL (WELLBUTRIN XL) 150 mg tablet Take 2 Tablets by mouth daily. 2/15/22   Sami Gallego Se, NP   ARIPiprazole (ABILIFY) 2 mg tablet Take 1 Tablet by mouth daily for 30 days. 2/15/22 3/17/22  Sami Gallego Se NP   propranoloL (INDERAL) 20 mg tablet Take 1 Tablet by mouth two (2) times a day. Indicated for episodes of anxiety and palpitations.  Dose can be adjusted up as needed 12/2/21   Leslie Valiente MD

## 2022-03-19 PROBLEM — E04.9 GOITER: Status: ACTIVE | Noted: 2022-03-04

## 2022-03-19 PROBLEM — F31.9 BIPOLAR 1 DISORDER (HCC): Status: ACTIVE | Noted: 2019-01-01

## 2022-03-19 PROBLEM — B18.2 CHRONIC HEPATITIS C (HCC): Status: ACTIVE | Noted: 2019-01-01

## 2022-03-20 PROBLEM — F41.9 ANXIETY: Status: ACTIVE | Noted: 2021-12-01

## 2022-03-25 ENCOUNTER — HOSPITAL ENCOUNTER (OUTPATIENT)
Dept: MAMMOGRAPHY | Age: 42
Discharge: HOME | End: 2022-03-25
Attending: OBSTETRICS & GYNECOLOGY
Payer: MEDICAID

## 2022-03-25 ENCOUNTER — HOSPITAL ENCOUNTER (OUTPATIENT)
Dept: ULTRASOUND IMAGING | Age: 42
Discharge: HOME | End: 2022-03-25
Attending: INTERNAL MEDICINE
Payer: MEDICAID

## 2022-03-25 DIAGNOSIS — Z01.419 WELL WOMAN EXAM WITH ROUTINE GYNECOLOGICAL EXAM: ICD-10-CM

## 2022-03-25 PROCEDURE — 76700 US EXAM ABDOM COMPLETE: CPT

## 2022-03-25 PROCEDURE — 77063 BREAST TOMOSYNTHESIS BI: CPT

## 2022-04-08 ENCOUNTER — OFFICE VISIT (OUTPATIENT)
Dept: ENDOCRINOLOGY | Age: 42
End: 2022-04-08
Payer: MEDICARE

## 2022-04-08 VITALS
DIASTOLIC BLOOD PRESSURE: 72 MMHG | OXYGEN SATURATION: 98 % | RESPIRATION RATE: 20 BRPM | BODY MASS INDEX: 31.36 KG/M2 | HEIGHT: 69 IN | WEIGHT: 211.7 LBS | SYSTOLIC BLOOD PRESSURE: 126 MMHG | HEART RATE: 48 BPM

## 2022-04-08 DIAGNOSIS — E04.1 THYROID NODULE: ICD-10-CM

## 2022-04-08 DIAGNOSIS — E04.1 THYROID NODULE: Primary | ICD-10-CM

## 2022-04-08 PROCEDURE — 99204 OFFICE O/P NEW MOD 45 MIN: CPT | Performed by: INTERNAL MEDICINE

## 2022-04-08 NOTE — PROGRESS NOTES
Chief Complaint   Patient presents with    Thyroid Problem     Pt is in with c/o a thyroid nodule that has double in it's size since Aug.    New Patient     History of Present Illness: Dana Ren is a 37 y.o. female with a past medical hx significant for bipolar 1 do, GERD, chronic hep c seen in referral from Justin Alaniz MD for discussion related to a toxic nodule. Milagro Mackey MD for hep c treatment- has second appt with Dr. Gunnar Hodges MD ENT on 28th of April- endorses feeling blockage from the nodule that improves with re-adjustment of positioning- denies dysphagia. Was started on propranolol for anxiety by Justin Alaniz MD. Weight has dropped from 222-->211 has been going to the gym, chronic diarrhea. Endorses tremor. No known family hx of nodules. Endorses severe anxiety and \"like I'm going to crawl out of my skin\". Has not used since Sept. Was wondering if injecting into the vein at the base of the nodule contributed to enlargement- did have vocal cord paralysis for 1 day following injection in the area. Past Medical History:   Diagnosis Date    GERD (gastroesophageal reflux disease)     Goiter 3/4/2022    Thyroid nodule      Past Surgical History:   Procedure Laterality Date    HX HEENT      HX ORTHOPAEDIC Left     plates    HX TUBAL LIGATION      HX WISDOM TEETH EXTRACTION Bilateral 1999     Current Outpatient Medications   Medication Sig    propranoloL (INDERAL) 10 mg tablet Take 1 Tablet by mouth two (2) times a day.  buPROPion XL (WELLBUTRIN XL) 150 mg tablet Take 2 Tablets by mouth daily.  ARIPiprazole (ABILIFY) 2 mg tablet Take 1 Tablet by mouth daily.  diazePAM (Valium) 5 mg tablet Take 1 Tablet by mouth every twelve (12) hours as needed for Anxiety. Max Daily Amount: 10 mg.  pantoprazole (PROTONIX) 40 mg tablet Take 1 Tablet by mouth daily. No current facility-administered medications for this visit.      No Known Allergies  Family History   Problem Relation Age of Onset    OSTEOARTHRITIS Mother     Drug Abuse Mother     Drug Abuse Father     Cancer Paternal Grandfather        Social Hx: hasn't injected drugs since Sept 2021  MJ daily    Review of Systems:  - See HPI    - Physical Examination:  Visit Vitals  /72   Pulse (!) 48   Resp 20   Ht 5' 9\" (1.753 m)   Wt 211 lb 11.2 oz (96 kg)   SpO2 98%   BMI 31.26 kg/m²   -   -   - - GENERAL: NCAT, Appears anxious  - EYES: EOMI, non-icteric, no proptosis   - Ear/Nose/Throat: L sided thyroid nodule is visible, injected at the base not in the actual nodule  - CARDIOVASCULAR: no cyanosis, no visible JVD   - RESPIRATORY: respiratory effort normal without any distress or labored breathing   - MUSCULOSKELETAL: Normal ROM of neck and upper extremities observed   - SKIN: No rash on face  - NEUROLOGIC:  tremor with outstretched hands  - PSYCHIATRIC: Normal affect, Normal insight and judgement     Data Reviewed:     Results for Trey Atkinson (MRN [id number]) as of 4/8/2022 10:52   Ref. Range 10/15/2021 18:42 10/23/2021 20:28   TSH Latest Ref Range: 0.36 - 3.74 uIU/mL 0.20 (L) 0.22 (L)     Assessment/Plan: This is a very pleasant 36 yo female with a past medical hx significant for chronic hep C, bipolar 1 d/o seen in referral from Amber Mayfield MD for discussion related to a toxic nodule with esophageal/tracheal deviation. Will obtain FNA for pre-op planning. Thyroid ultrasound would not alter management. Obtain pre-op thyroid labs to ensure she is not markedly biochemically hypERthyroid - would use low dose MMI pre-op if so. Amber Mayfield MD started propranolol which will improve T4-->T3 conversion to some extent. Likely injected into recurrent laryngeal nerve which led to voice changes transiently.      #toxic nodule with tracheal/esophageal deviation  -obtain TSH/Free T4/ Total T3 and TSI pre-op to ensure MMI or SSKI are not required  -currently is taking propranolol 10mg BID  -obtain FNA of L sided nodule  -obtain TSH/Free T4 8 weeks post-op to ensure R side of thyroid assumes role of both sides    Copy sent to:Cecil Strong MD, Rex Campbell MD    RTC Deborah King [facility]

## 2022-04-09 DIAGNOSIS — F41.9 ANXIETY: ICD-10-CM

## 2022-04-11 ENCOUNTER — TELEPHONE (OUTPATIENT)
Dept: ENDOCRINOLOGY | Age: 42
End: 2022-04-11

## 2022-04-11 RX ORDER — PROPRANOLOL HYDROCHLORIDE 10 MG/1
TABLET ORAL
Qty: 60 TABLET | Refills: 0 | Status: SHIPPED | OUTPATIENT
Start: 2022-04-11 | End: 2022-05-12 | Stop reason: SDUPTHER

## 2022-04-11 NOTE — TELEPHONE ENCOUNTER
4/11/2022   11:18 AM    Pt called and stated she wants to schedule a test order fine needle for a nodule in her neck. She stated that the number provider to her on the paperwork is for our office. She would [facility] clarification [facility] Dr[facility] Alfredo Grey. Pt can be reached at [phone].     Thanks,   Sasha Lizama

## 2022-04-12 ENCOUNTER — OFFICE VISIT (OUTPATIENT)
Dept: BEHAVIORAL/MENTAL HEALTH CLINIC | Age: 42
End: 2022-04-12
Payer: MEDICAID

## 2022-04-12 VITALS
BODY MASS INDEX: 30.66 KG/M2 | WEIGHT: 207 LBS | HEIGHT: 69 IN | HEART RATE: 59 BPM | SYSTOLIC BLOOD PRESSURE: 123 MMHG | RESPIRATION RATE: 17 BRPM | OXYGEN SATURATION: 98 % | DIASTOLIC BLOOD PRESSURE: 80 MMHG | TEMPERATURE: 97.3 F

## 2022-04-12 DIAGNOSIS — F32.A ANXIETY AND DEPRESSION: ICD-10-CM

## 2022-04-12 DIAGNOSIS — F41.9 ANXIETY AND DEPRESSION: ICD-10-CM

## 2022-04-12 DIAGNOSIS — F31.9 BIPOLAR 1 DISORDER (HCC): Primary | ICD-10-CM

## 2022-04-12 PROCEDURE — G8417 CALC BMI ABV UP PARAM F/U: HCPCS | Performed by: NURSE PRACTITIONER

## 2022-04-12 PROCEDURE — 99214 OFFICE O/P EST MOD 30 MIN: CPT | Performed by: NURSE PRACTITIONER

## 2022-04-12 PROCEDURE — G9717 DOC PT DX DEP/BP F/U NT REQ: HCPCS | Performed by: NURSE PRACTITIONER

## 2022-04-12 PROCEDURE — G8427 DOCREV CUR MEDS BY ELIG CLIN: HCPCS | Performed by: NURSE PRACTITIONER

## 2022-04-12 RX ORDER — DIAZEPAM 5 MG/1
5 TABLET ORAL
Qty: 60 TABLET | Refills: 0 | Status: SHIPPED | OUTPATIENT
Start: 2022-04-12 | End: 2022-05-12 | Stop reason: SDUPTHER

## 2022-04-12 NOTE — PATIENT INSTRUCTIONS
Words of wisdom    When something bad happens to you, you have 3 choices:  1. Let it DEFINE YOU,  2. Let it DESTROY YOU,   3. OR YOU CAN LET IT STRENGTHEN YOU. Life is like a camera, so:                                · Just focus on the important  · Capture the good times  · Develop from the negatives  · And if things dont turn out, take another shot     Learn from the mistakes of others, you cant live long enough to make them all yourself. Always put yourself in others shoes, if it hurts you, then it probably hurts the other person to. The happiest of people dont necessarily have the best of everything, they just make the most of everything they have and that comes along their way. Life is 10% of what happens to you and 90 % of how you respond to it.      Pleases include the following foods in your diet for mood:  · Drink GREEN TEA as often as you can but stop the caffeinated ones before 6 pm  · Omega 3 Fatty Acids/Fish oil/ or flax seeds, citrus fruits, roseanne, turmeric, chocolates  · Sunflower seeds, Pumpkin seeds, Almonds,   · Oatmeal, Eggs, grapes, yogurt, Probiotics (like Activia)  · Vit C, E, D every few days,  · Exercise at least 20 minutes/day (walk 10,000 steps)

## 2022-04-12 NOTE — PROGRESS NOTES
CHIEF COMPLAINT:  Kiley Guido is a 37 y.o. female and was seen today for follow-up of psychiatric condition and psychotropic medication management. HPI:    [facility] reports the following psychiatric symptoms by hx:  depression and anxiety. Overall symptoms have been present for years. Currently symptoms are  of moderate severity. The symptoms occur contantly. Pt reports medications are effective. Met with pt for appt today  to review current treatment plan. FAMILY/SOCIAL HX:   Psychosocial Stressors     REVIEW OF SYSTEMS:  Psychiatric symptoms being monitored for:  depression, ADHD, anxiety  Appetite:decreased   Sleep: improved   Neuro: denies   Endocrine: Thyroid nodules     Visit Vitals  /80 (BP 1 Location: Left upper arm, BP Patient Position: Sitting, BP Cuff Size: Adult)   Pulse (!) 59   Temp 97.3 °F (36.3 °C) (Temporal)   Resp 17   Ht 5' 9\" (1.753 m)   Wt 93.9 kg (207 lb)   SpO2 98%   BMI 30.57 kg/m²       Side Effects:  none    MENTAL STATUS EXAM:   Sensorium  oriented to time, place and person   Relations cooperative   Appearance:  age appropriate and within normal Limits   Motor Behavior:  within normal limits   Speech:  normal pitch and normal volume   Thought Process: within normal limits   Thought Content free of delusions and free of hallucinations   Suicidal ideations none   Homicidal ideations none   Mood:  anxious   Affect:  anxious   Memory recent  adequate   Memory remote:  adequate   Concentration:  impaired   Abstraction:  abstract   Insight:  good   Reliability good   Judgment:  good     MEDICAL DECISION MAKING:  Problems addressed today:    ICD-10-CM ICD-9-CM    1. Bipolar 1 disorder (HCC)  F31.9 296.7    2. Anxiety and depression  F41.9 300.00 diazePAM (Valium) 5 mg tablet    F32. A 311             Assessment:   [facility] is responding to treatment. Symptoms are less in severity and less in occurrence.  Her  recently  and she ran out of medications, as a result her anxiety is exacerbated. She has has hyperthyroidism and has to have surgery to remove a nodule. Reinforced positive coping strategies. Discussed current medications and dosages. There is no need to change medications at this time. Reviewed treatment goals and target symptoms to monitor for. Denies SI/HI/AH/VH delusions. Plan:   1. Current Outpatient Medications   Medication Sig Dispense Refill    diazePAM (Valium) 5 mg tablet Take 1 Tablet by mouth every twelve (12) hours as needed for Anxiety. Max Daily Amount: 10 mg. 60 Tablet 0    propranoloL (INDERAL) 10 mg tablet TAKE 1 TABLET BY MOUTH TWO TIMES A DAY. 60 Tablet 0    buPROPion XL (WELLBUTRIN XL) 150 mg tablet Take 2 Tablets by mouth daily. 60 Tablet 1    ARIPiprazole (ABILIFY) 2 mg tablet Take 1 Tablet by mouth daily. 30 Tablet 1    pantoprazole (PROTONIX) 40 mg tablet Take 1 Tablet by mouth daily. 30 Tablet 2          medication changes made today: Cont Inderal 10 mg, cont Valium 5 mg, cont Wellbutrin 150 mg, cont Abilify 2 mg. 2.  Counseling and coordination of care including instructions for treatment, risks/benefits, risk factor reduction and patient/family education. She agrees with the plan. Patient instructed to call with any side effects, questions or issues. 3.    Follow-up and Dispositions    · Return in about 4 weeks (around 5/10/2022).            4/12/2022  Juan Moody NP

## 2022-04-12 NOTE — PROGRESS NOTES
Chief Complaint   Patient presents with    Medication Management     Visit Vitals  /80 (BP 1 Location: Left upper arm, BP Patient Position: Sitting, BP Cuff Size: Adult)   Pulse (!) 59   Temp 97.3 °F (36.3 °C) (Temporal)   Resp 17   Ht 5' 9\" (1.753 m)   Wt 93.9 kg (207 lb)   SpO2 98%   BMI 30.57 kg/m²     Provider to review medications

## 2022-04-13 LAB
ALBUMIN SERPL-MCNC: 4.4 G/DL (ref 3.8–4.8)
ALBUMIN SERPL-MCNC: 4.4 G/DL (ref 3.8–4.8)
ALBUMIN/GLOB SERPL: 1.4 {RATIO} (ref 1.2–2.2)
ALBUMIN/GLOB SERPL: 1.4 {RATIO} (ref 1.2–2.2)
ALP SERPL-CCNC: 97 IU/L (ref 44–121)
ALP SERPL-CCNC: 97 IU/L (ref 44–121)
ALT SERPL-CCNC: 144 IU/L (ref 0–32)
ALT SERPL-CCNC: 144 IU/L (ref 0–32)
AST SERPL-CCNC: 78 IU/L (ref 0–40)
AST SERPL-CCNC: 78 IU/L (ref 0–40)
BASOPHILS # BLD AUTO: 0.1 X10E3/UL (ref 0–0.2)
BASOPHILS NFR BLD AUTO: 1 %
BILIRUB SERPL-MCNC: 1.1 MG/DL (ref 0–1.2)
BILIRUB SERPL-MCNC: 1.1 MG/DL (ref 0–1.2)
BUN SERPL-MCNC: 10 MG/DL (ref 6–24)
BUN SERPL-MCNC: 10 MG/DL (ref 6–24)
BUN/CREAT SERPL: 12 (ref 9–23)
BUN/CREAT SERPL: 12 (ref 9–23)
CALCIUM SERPL-MCNC: 10.2 MG/DL (ref 8.7–10.2)
CALCIUM SERPL-MCNC: 10.2 MG/DL (ref 8.7–10.2)
CHLORIDE SERPL-SCNC: 97 MMOL/L (ref 96–106)
CHLORIDE SERPL-SCNC: 97 MMOL/L (ref 96–106)
CO2 SERPL-SCNC: 25 MMOL/L (ref 20–29)
CO2 SERPL-SCNC: 25 MMOL/L (ref 20–29)
CREAT SERPL-MCNC: 0.86 MG/DL (ref 0.57–1)
CREAT SERPL-MCNC: 0.86 MG/DL (ref 0.57–1)
EGFR: 88 ML/MIN/1.73
EGFR: 88 ML/MIN/1.73
EOSINOPHIL # BLD AUTO: 0.2 X10E3/UL (ref 0–0.4)
EOSINOPHIL NFR BLD AUTO: 3 %
ERYTHROCYTE [DISTWIDTH] IN BLOOD BY AUTOMATED COUNT: 13.1 % (ref 11.7–15.4)
GLOBULIN SER CALC-MCNC: 3.1 G/DL (ref 1.5–4.5)
GLOBULIN SER CALC-MCNC: 3.1 G/DL (ref 1.5–4.5)
GLUCOSE SERPL-MCNC: 81 MG/DL (ref 65–99)
GLUCOSE SERPL-MCNC: 81 MG/DL (ref 65–99)
HCT VFR BLD AUTO: 46 % (ref 34–46.6)
HGB BLD-MCNC: 15.5 G/DL (ref 11.1–15.9)
IMM GRANULOCYTES # BLD AUTO: 0 X10E3/UL (ref 0–0.1)
IMM GRANULOCYTES NFR BLD AUTO: 0 %
LYMPHOCYTES # BLD AUTO: 2.9 X10E3/UL (ref 0.7–3.1)
LYMPHOCYTES NFR BLD AUTO: 34 %
MCH RBC QN AUTO: 31.2 PG (ref 26.6–33)
MCHC RBC AUTO-ENTMCNC: 33.7 G/DL (ref 31.5–35.7)
MCV RBC AUTO: 93 FL (ref 79–97)
MONOCYTES # BLD AUTO: 0.8 X10E3/UL (ref 0.1–0.9)
MONOCYTES NFR BLD AUTO: 9 %
NEUTROPHILS # BLD AUTO: 4.5 X10E3/UL (ref 1.4–7)
NEUTROPHILS NFR BLD AUTO: 53 %
PLATELET # BLD AUTO: 298 X10E3/UL (ref 150–450)
POTASSIUM SERPL-SCNC: 5 MMOL/L (ref 3.5–5.2)
POTASSIUM SERPL-SCNC: 5 MMOL/L (ref 3.5–5.2)
PROT SERPL-MCNC: 7.5 G/DL (ref 6–8.5)
PROT SERPL-MCNC: 7.5 G/DL (ref 6–8.5)
PTH-INTACT SERPL-MCNC: 29 PG/ML (ref 15–65)
RBC # BLD AUTO: 4.97 X10E6/UL (ref 3.77–5.28)
SODIUM SERPL-SCNC: 136 MMOL/L (ref 134–144)
SODIUM SERPL-SCNC: 136 MMOL/L (ref 134–144)
T3 SERPL-MCNC: 194 NG/DL (ref 71–180)
T4 FREE SERPL-MCNC: 1.26 NG/DL (ref 0.82–1.77)
T4 FREE SERPL-MCNC: 1.26 NG/DL (ref 0.82–1.77)
TSH SERPL DL<=0.005 MIU/L-ACNC: 0.69 UIU/ML (ref 0.45–4.5)
TSH SERPL DL<=0.005 MIU/L-ACNC: 0.69 UIU/ML (ref 0.45–4.5)
TSI SER-ACNC: <0.1 IU/L (ref 0–0.55)
WBC # BLD AUTO: 8.5 X10E3/UL (ref 3.4–10.8)

## 2022-04-14 NOTE — LETTER
4/14/2022    Ms. Carmen Dumont  [address]      Dear Carmen Dumont:    Please find your most recent results below. Resulted Orders   PTH INTACT   Result Value Ref Range    PTH, Intact 29 15 - 65 pg/mL    Narrative    Performed at:  [address]  [id number]  : Minal Riggs MD, Phone:  [phone]   T3 TOTAL   Result Value Ref Range    T3, total 194 (H) 71 - 180 ng/dL    Narrative    Performed at:  [address]  [id number]  : Minal Riggs MD, Phone:  [phone]   THYROID STIMULATING IMMUNOGLOBULIN   Result Value Ref Range    Thyroid Stim Immunoglobulin <0.10 0.00 - 0.55 IU/L    Narrative    Performed at:  [address]  [id number]  : Minal Riggs MD, Phone:  [phone]   CBC WITH AUTOMATED DIFF   Result Value Ref Range    WBC 8.5 3.4 - 10.8 x10E3/uL    RBC 4.97 3.77 - 5.28 x10E6/uL    HGB 15.5 11.1 - 15.9 g/dL    HCT 46.0 34.0 - 46.6 %    MCV 93 79 - 97 fL    MCH 31.2 26.6 - 33.0 pg    MCHC 33.7 31.5 - 35.7 g/dL    RDW 13.1 11.7 - 15.4 %    PLATELET 851 159 - 414 x10E3/uL    NEUTROPHILS 53 Not Estab. %    Lymphocytes 34 Not Estab. %    MONOCYTES 9 Not Estab. %    EOSINOPHILS 3 Not Estab. %    BASOPHILS 1 Not Estab. %    ABS. NEUTROPHILS 4.5 1.4 - 7.0 x10E3/uL    Abs Lymphocytes 2.9 0.7 - 3.1 x10E3/uL    ABS. MONOCYTES 0.8 0.1 - 0.9 x10E3/uL    ABS. EOSINOPHILS 0.2 0.0 - 0.4 x10E3/uL    ABS. BASOPHILS 0.1 0.0 - 0.2 x10E3/uL    IMMATURE GRANULOCYTES 0 Not Estab. %    ABS. IMM.  GRANS. 0.0 0.0 - 0.1 x10E3/uL    Narrative    Performed at:  [address]  [id number]  : Minal Riggs MD, Phone:  [phone]   METABOLIC PANEL, COMPREHENSIVE   Result Value Ref Range    Glucose 81 65 - 99 mg/dL    BUN 10 6 - 24 mg/dL    Creatinine 0.86 0.57 - 1.00 mg/dL    eGFR 88 >59 mL/min/1.73    BUN/Creatinine ratio 12 9 - 23    Sodium 136 134 - 144 mmol/L    Potassium 5.0 3.5 - 5.2 mmol/L    Chloride 97 96 - 106 mmol/L    CO2 25 20 - 29 mmol/L    Calcium 10.2 8.7 - 10.2 mg/dL    Protein, total 7.5 6.0 - 8.5 g/dL    Albumin 4.4 3.8 - 4.8 g/dL    GLOBULIN, TOTAL 3.1 1.5 - 4.5 g/dL    A-G Ratio 1.4 1.2 - 2.2    Bilirubin, total 1.1 0.0 - 1.2 mg/dL    Alk. phosphatase 97 44 - 121 IU/L    AST (SGOT) 78 (H) 0 - 40 IU/L    ALT (SGPT) 144 (H) 0 - 32 IU/L    Narrative    Performed at:  [address] [place] [address]  [id number]  : Glorietta Dandy MD, Phone:  [phone]   T4, FREE   Result Value Ref Range    T4, Free 1.26 0.82 - 1.77 ng/dL    Narrative    Performed at:  [address] [place] [address]  [id number]  : Glorietta Dandy MD, Phone:  [phone]   TSH 3RD GENERATION   Result Value Ref Range    TSH 0.688 0.450 - 4.500 uIU/mL    Narrative    Performed at:  [address] [place] [address]  [id number]  : Glorietta Dandy MD, Phone:  [phone]   METABOLIC PANEL, COMPREHENSIVE   Result Value Ref Range    Glucose 81 65 - 99 mg/dL    BUN 10 6 - 24 mg/dL    Creatinine 0.86 0.57 - 1.00 mg/dL    eGFR 88 >59 mL/min/1.73    BUN/Creatinine ratio 12 9 - 23    Sodium 136 134 - 144 mmol/L    Potassium 5.0 3.5 - 5.2 mmol/L    Chloride 97 96 - 106 mmol/L    CO2 25 20 - 29 mmol/L    Calcium 10.2 8.7 - 10.2 mg/dL    Protein, total 7.5 6.0 - 8.5 g/dL    Albumin 4.4 3.8 - 4.8 g/dL    GLOBULIN, TOTAL 3.1 1.5 - 4.5 g/dL    A-G Ratio 1.4 1.2 - 2.2    Bilirubin, total 1.1 0.0 - 1.2 mg/dL    Alk.  phosphatase 97 44 - 121 IU/L    AST (SGOT) 78 (H) 0 - 40 IU/L    ALT (SGPT) 144 (H) 0 - 32 IU/L    Narrative    Performed at:  [address] [employer]  [address]  [id number]  : Glorietta Dandy MD, Phone:  [phone]   T4, FREE   Result Value Ref Range    T4, Free 1.26 0.82 - 1.77 ng/dL    Narrative    Performed at:  [address]  [id number]  : Murray Sotomayor MD, Phone:  [phone]   TSH 3RD GENERATION   Result Value Ref Range    TSH 0.688 0.450 - 4.500 uIU/mL    Narrative    Performed at:  [address]  [id number]  : Murray Sotomayor MD, Phone:  [phone]       RECOMMENDATIONS:    Valjaqueline Platadt,    Your thyroid levels are appropriate for surgery- they are not too high. Your electrolytes and kidney functions are normal. Your liver function tests are elevated and that's most likely related to the hepatitis C for which you are seeing the Hepatologist. Please don't forget to have the labs drawn after surgery.     Please call me if you have any questions: [phone]    Sincerely,      Gold Huntley MD

## 2022-04-25 ENCOUNTER — HOSPITAL ENCOUNTER (OUTPATIENT)
Dept: ULTRASOUND IMAGING | Age: 42
Discharge: HOME | End: 2022-04-25
Attending: INTERNAL MEDICINE
Payer: MEDICAID

## 2022-04-25 DIAGNOSIS — E04.1 THYROID NODULE: ICD-10-CM

## 2022-04-25 PROCEDURE — 77030018870 HC TY PARCNT BD -B

## 2022-04-25 PROCEDURE — C1729 CATH, DRAINAGE: HCPCS

## 2022-04-25 PROCEDURE — 10005 FNA BX W/US GDN 1ST LES: CPT

## 2022-04-25 PROCEDURE — 2709999900 HC NON-CHARGEABLE SUPPLY

## 2022-04-25 PROCEDURE — 88172 CYTP DX EVAL FNA 1ST EA SITE: CPT

## 2022-04-25 PROCEDURE — 77030038620

## 2022-04-25 PROCEDURE — 88173 CYTOPATH EVAL FNA REPORT: CPT

## 2022-04-25 PROCEDURE — 77030041470 HC TBNG SET PARA GISP -B

## 2022-04-25 PROCEDURE — 77030003666 HC NDL SPINAL BD -A

## 2022-04-25 PROCEDURE — 74011000250 HC RX REV CODE- 250: Performed by: RADIOLOGY

## 2022-04-25 RX ORDER — SODIUM BICARBONATE 42 MG/ML
1 INJECTION, SOLUTION INTRAVENOUS
Status: COMPLETED | OUTPATIENT
Start: 2022-04-25 | End: 2022-04-25

## 2022-04-25 RX ORDER — LIDOCAINE HYDROCHLORIDE 10 MG/ML
10 INJECTION INFILTRATION; PERINEURAL
Status: COMPLETED | OUTPATIENT
Start: 2022-04-25 | End: 2022-04-25

## 2022-04-25 RX ADMIN — SODIUM BICARBONATE 2 MG: 42 INJECTION, SOLUTION INTRAVENOUS at 14:09

## 2022-04-25 RX ADMIN — LIDOCAINE HYDROCHLORIDE 10 ML: 10 INJECTION, SOLUTION INFILTRATION; PERINEURAL at 14:08

## 2022-04-27 NOTE — LETTER
2022    Ms. Leah Cobb  [address]      Dear Leah Cobb:    Please find your most recent results below. Resulted Orders   FINE NEEDLE ASPIRATION BX    Narrative                      [address] [facility]                              @ [facility]                                 Latanya Cerrato [address]                    Tel: [phone]    Fax: [phone]    C. Chancy Cancer., M.D. Beth B. Luan Barthel, M.D. Orval Easter, M.D.                    Sylvia Richter. RUIZ Flores M.D. Iris D. Effie Rude, M.D., M.P.H. Fabien Madrigal M.D. RUIZ Tejada M.D. Corinthia Lunch Dagmar Noble, M.D. Jonelle Pester Montez Craven, M.D. Patient Name: Dinora Galeas          Specimen #:[id number]  Patient ID:   [id number]                     Location: [facility]  /Gender:   1980 (Age: 41)/F         Account [de-identified]  Collect Date: 2022                     Received:  2022  Reported:     2022    Physician(s): MD KERRI Almanzar PA    CYTOLOGY REPORT        * * *CLINICAL HISTORY* * *     Left thyroid nodule 3.9 x 3.6 x 2.5cm    * * *Specimen Description* * *     Prepared & examined slide(s) x 5  Material is available for Afirma and will be reflexively ordered for  appropriate diagnostic category      Specimen Preparation / Slide(s):  Cyto eval, FNA (R) x 1  FNA, Interpretation and Report (R) x 1  Labels Generated 10 (R) x 10  THYA (R) x 1      * * *Specimen Source* * *     1: Thyroid, left, Fine needle aspiration:      CYTOLOGIC INTERPRETATION:   Thyroid, left, fine-needle aspiration:  Atypical cells of undetermined significance ([place] category III). Molecular studies are pending and will be reported in an addendum.     * * *General Categorization* * *     Atypical    * * *Specimen Adequacy* * *     Satisfactory for evaluation. * * *Comment* * *  Microscopic sections examined; see final diagnosis. CPT: 58747       * * *Electronically Signed Out* * *  Chinyere Romero MD  channing/4/26/2022      * * *Intra-Procedural consultation (to be confirmed by cytologic  interpretation)  Preliminary, intraoperative assessment of adequacy is Adequate for  diagnosis. / AT         RECOMMENDATIONS:    Hey there [address],    Your biopsy results came back as being in the [place] zone\" so we need more information- it will take about 2 weeks for the DNA markers to return. I'll give you a call when they do.      Please call me if you have any questions: [phone]    Sincerely,      Sravani Becker MD

## 2022-05-04 ENCOUNTER — OFFICE VISIT (OUTPATIENT)
Dept: HEMATOLOGY | Age: 42
End: 2022-05-04
Payer: MEDICAID

## 2022-05-04 VITALS
WEIGHT: 209.6 LBS | HEART RATE: 57 BPM | TEMPERATURE: 97.8 F | BODY MASS INDEX: 31.04 KG/M2 | SYSTOLIC BLOOD PRESSURE: 111 MMHG | HEIGHT: 69 IN | DIASTOLIC BLOOD PRESSURE: 71 MMHG

## 2022-05-04 DIAGNOSIS — B18.2 CHRONIC HEPATITIS C WITHOUT HEPATIC COMA (HCC): Primary | ICD-10-CM

## 2022-05-04 PROCEDURE — 99213 OFFICE O/P EST LOW 20 MIN: CPT | Performed by: NURSE PRACTITIONER

## 2022-05-04 RX ORDER — GLECAPREVIR AND PIBRENTASVIR 40; 100 MG/1; MG/1
3 TABLET, FILM COATED ORAL DAILY
Qty: 84 TABLET | Refills: 1 | Status: SHIPPED | OUTPATIENT
Start: 2022-05-04 | End: 2022-07-19

## 2022-05-04 NOTE — PROGRESS NOTES
[facility], [place], [place], [facility], [place]      URVASHI Rivers, [facility]     Gracie Parrish [facility]   Lexie Johns, FNP-C Cherise Ganser, [facility]       Dariusz Hartley [address] 136    at [address], [facility] Keegan Rojas  22.    [phone]    FAX: [address] [place] [address], [address]    [phone]    FAX: [phone]           Patient Care Team:  Randolph Templeton MD as PCP - General (Family Medicine)  Randolph Templeton MD as PCP - [facility]  Ramin Broderick MD as Physician (Endocrinology)      Problem List  Date Reviewed: 5/4/2022          Codes Class Noted    Thyroid nodule ICD-10-CM: E04.1  ICD-9-CM: 241.0  Unknown        Goiter ICD-10-CM: E04.9  ICD-9-CM: 240.9  3/4/2022        GERD (gastroesophageal reflux disease) ICD-10-CM: K21.9  ICD-9-CM: 530.81  Unknown        Anxiety ICD-10-CM: F41.9  ICD-9-CM: 300.00  12/1/2021        Chronic hepatitis [facility] ([facility] ICD-10-CM: B18.2  ICD-9-CM: 070.54  2019        Bipolar 1 disorder ([facility] ICD-10-CM: F31.9  ICD-9-CM: 296.7  2019            Noman Joseph is being seen at [facility] for management of chronic HCV. The active problem list, all pertinent past medical history, medications, liver histology, radiologic findings and laboratory findings related to the liver disorder were reviewed and discussed with the patient. The patient is a 37 y.o.  female who was found to have abnormalities in liver chemistries and subsequently tested positive for chronic HCV in 12/2021. The patient does not have any symptoms which could be attributed to the liver disorder.     The patient is not experiencing the following symptoms which are commonly seen in this liver disorder: fatigue, pain in the right side over the liver, yellowing of the skin or eyes. The patient completes all daily activities without any functional limitations. ASSESSMENT AND PLAN:  Chronic HCV   Chronic HCV with no fibrosis. Liver transaminases are elevated. ALP is normal. Liver function is normal. The platelet count is normal.      Chronic HCV treatment  The patient has not been treated for HCV. The patient has HCV genotype 3. I will start with Kaye Gissellei since it is 8 weeks. Screening for hepatocellular carcinoma  HCC screening is not necessary if the patient has no evidence of cirrhosis. Treatment of other medical problems in patients with chronic liver disease  There are no contraindications for the patient to take most medications necessary for treatment of other medical issues. Counseling for alcohol in patients with chronic liver disease  The patient was counseled regarding alcohol consumption and the effect of alcohol on chronic liver disease. The patient does not consume any significant amount of alcohol. Substance use  The patient was counseled regarding the risk of overdose and death from using opioids and other narcotic drugs. Discussed the risk of becoming reinfected with HCV once they are cured if they resume IV drug use or inhaling drugs nasally. The patient has not used drugs since 2021. Vaccinations   Routine vaccinations against other bacterial and viral agents can be performed as indicated. Annual flu vaccination should be administered if indicated. ALLERGIES  No Known Allergies    MEDICATIONS  Current Outpatient Medications   Medication Sig    diazePAM (Valium) 5 mg tablet Take 1 Tablet by mouth every twelve (12) hours as needed for Anxiety. Max Daily Amount: 10 mg.  propranoloL (INDERAL) 10 mg tablet TAKE 1 TABLET BY MOUTH TWO TIMES A DAY.  buPROPion XL (WELLBUTRIN XL) 150 mg tablet Take 2 Tablets by mouth daily.     ARIPiprazole (ABILIFY) 2 mg tablet Take 1 Tablet by mouth daily.  pantoprazole (PROTONIX) 40 mg tablet Take 1 Tablet by mouth daily. No current facility-administered medications for this visit. FAMILY HISTORY:  The father has/had the following chronic disease(s): alcoholism. The mother has/had the following chronic disease(s): alcoholism. SOCIAL HISTORY:  The patient is . The spouse has not been tested for HCV   The patient has 5 children. The patient has never used tobacco products. She vapes and smokes marijuana. The patient has never consumed significant amounts of alcohol. The patient does not work. PHYSICAL EXAMINATION:  Visit Vitals  /71 (BP 1 Location: Right arm, BP Patient Position: Sitting, BP Cuff Size: Adult long)   Pulse (!) 57   Temp 97.8 °F (36.6 °C) (Temporal)   Ht 5' 9\" (1.753 m)   Wt 209 lb 9.6 oz (95.1 kg)   BMI 30.95 kg/m²     General: No acute distress. Eyes: Sclera anicteric. ENT: No oral lesions. Nodes: No adenopathy. Skin: No spider angiomata. No jaundice. No palmar erythema. Respiratory: Lungs clear to auscultation. Cardiovascular: Regular heart rate. No murmurs. No JVD. Abdomen: Soft non-tender, liver size normal to percussion/palpation. Spleen not palpable. No obvious ascites. Extremities: No edema. No muscle wasting. No gross arthritic changes. Neurologic: Alert and oriented. Cranial nerves grossly intact. No asterixis.     LABORATORY STUDIES:  Liver [place] of [address] [place] [address] Units 4/12/2022 4/12/2022   WBC 3.4 - 10.8 x10E3/uL  8.5   ANC 1.4 - 7.0 x10E3/uL  4.5   HGB 11.1 - 15.9 g/dL  15.5    - 450 x10E3/uL  298   INR 0.9 - 1.1       AST 0 - 40 IU/L 78 (H) 78 (H)   ALT 0 - 32 IU/L 144 (H) 144 (H)   Alk Phos 44 - 121 IU/L 97 97   Bili, Total 0.0 - 1.2 mg/dL 1.1 1.1   Albumin 3.8 - 4.8 g/dL 4.4 4.4   BUN 6 - 24 mg/dL 10 10   Creat 0.57 - 1.00 mg/dL 0.86 0.86   Na 134 - 144 mmol/L 136 136   K 3.5 - 5.2 mmol/L 5.0 5.0   Cl 96 - 106 mmol/L 97 97   CO2 20 - 29 mmol/L 25 25   Glucose 65 - 99 mg/dL 81 81     SEROLOGIES:  Serologies Latest Ref Rng & Units 3/4/2022 12/2/2021   Hep A Ab, Total Negative   Negative    Hep B Surface Ag Negative  Negative   Hep B Core Ab, Total Negative  Negative   Hep B Surface Ab mIU/mL 10.05    Hep B Surface Ab Interp NONREACTIVE   REACTIVE (A)    Hep C Genotype   3   HCV RT-PCR, Quant IU/mL  1,010,000   HCV Log10 log10 IU/mL  6.004     LIVER HISTOLOGY:  3/2022. HCV FibroSure F0. ENDOSCOPIC PROCEDURES:  Not available or performed    RADIOLOGY:  3/2022. Ultrasound of liver. Echogenic consistent with chronic liver disease. No liver mass lesions. No dilated bile ducts. No ascites. OTHER TESTING:  Not available or performed    FOLLOW-UP:  All of the issues listed above in the assessment and plan were discussed with the patient. All questions were answered. The patient expressed a clear understanding of the above. [address] [place] [address] 4 weeks after she starts medication via phone visit overbook. I will mail letter with first results with HCV verbiage letter for her records.     Nadia Staff, [facility]  [facility] [place] of [address], [address] Keegan Rojas  22. [phone]

## 2022-05-05 ENCOUNTER — TELEPHONE (OUTPATIENT)
Dept: ENDOCRINOLOGY | Age: 42
End: 2022-05-05

## 2022-05-05 NOTE — TELEPHONE ENCOUNTER
Irene Silva from Dr Gladys Lucas office called requesting pt's FNA final report. She stated they are unable to schedule without having the report. Irene Silva was made aware that the FNA was completed but the pathology results are still pending.

## 2022-05-09 ENCOUNTER — DOCUMENTATION ONLY (OUTPATIENT)
Dept: ENDOCRINOLOGY | Age: 42
End: 2022-05-09

## 2022-05-09 NOTE — PROGRESS NOTES
Spoke with pt to let her know AFIRMA results.      Ophelia Lucia, [facility]

## 2022-05-12 ENCOUNTER — TELEPHONE (OUTPATIENT)
Dept: ENDOCRINOLOGY | Age: 42
End: 2022-05-12

## 2022-05-12 ENCOUNTER — VIRTUAL VISIT (OUTPATIENT)
Dept: BEHAVIORAL/MENTAL HEALTH CLINIC | Age: 42
End: 2022-05-12
Payer: MEDICAID

## 2022-05-12 DIAGNOSIS — F41.9 ANXIETY AND DEPRESSION: ICD-10-CM

## 2022-05-12 DIAGNOSIS — F39 MOOD DISORDER (HCC): ICD-10-CM

## 2022-05-12 DIAGNOSIS — F41.9 ANXIETY: ICD-10-CM

## 2022-05-12 DIAGNOSIS — F32.A ANXIETY AND DEPRESSION: ICD-10-CM

## 2022-05-12 PROCEDURE — 99214 OFFICE O/P EST MOD 30 MIN: CPT | Performed by: NURSE PRACTITIONER

## 2022-05-12 RX ORDER — ARIPIPRAZOLE 2 MG/1
2 TABLET ORAL DAILY
Qty: 30 TABLET | Refills: 2 | Status: SHIPPED | OUTPATIENT
Start: 2022-05-12 | End: 2022-07-19 | Stop reason: SDUPTHER

## 2022-05-12 RX ORDER — DIAZEPAM 5 MG/1
5 TABLET ORAL
Qty: 60 TABLET | Refills: 0 | Status: SHIPPED | OUTPATIENT
Start: 2022-05-12 | End: 2022-07-05 | Stop reason: SDUPTHER

## 2022-05-12 RX ORDER — PROPRANOLOL HYDROCHLORIDE 10 MG/1
10 TABLET ORAL 2 TIMES DAILY
Qty: 60 TABLET | Refills: 2 | Status: SHIPPED | OUTPATIENT
Start: 2022-05-12 | End: 2022-07-19 | Stop reason: SDUPTHER

## 2022-05-12 RX ORDER — BUPROPION HYDROCHLORIDE 150 MG/1
300 TABLET ORAL DAILY
Qty: 60 TABLET | Refills: 2 | Status: SHIPPED | OUTPATIENT
Start: 2022-05-12 | End: 2022-07-19 | Stop reason: SDUPTHER

## 2022-05-12 NOTE — TELEPHONE ENCOUNTER
5/12/2022  3:19 PM      Jennie from [facility] called on behalf of pt. Mandi Hopkins stated they are waiting for the final biopsy to be sent to them. Mandi Hopikns stated is afima available. Mandi Hopkins also stated they need the fna results. Mandi Hopkins also needs final pathology report so that they can figure out which surgery the pt will be having. [facility]#[phone] ext [phone]      Thanks,  Chet Vallecillo

## 2022-05-12 NOTE — TELEPHONE ENCOUNTER
5/12/2022  9:05 AM    Pt called and stated she is checking the status of her pending report.     Pt can be reached at [phone] ext [phone]  And if it can be fax, fax to [phone]    Thanks,   Ashanti Rogers Pt presented to ER with c/o back pain. Pt stated she was sent to ER by Dr. Pathak to be admitted for possible surgery in the morning. Pt without acute distress. Family at bedside. Will continue to monitor.

## 2022-05-12 NOTE — PROGRESS NOTES
CHIEF COMPLAINT:  Marcus Grey is a 37 y.o. female and was seen today for follow-up of psychiatric condition and psychotropic medication management. HPI:    Jayleen reports the following psychiatric symptoms by hx:  depression and anxiety.  Overall symptoms have been present for years. Currently symptoms are  of moderate severity. The symptoms occur contantly.  Pt reports medications are effective. Met with pt via telehealth for appt today  to review current treatment plan. She consents to virtual apt. FAMILY/SOCIAL HX:   Psychosocial Stressors  Upcoming surgery  Death of    Started new job     REVIEW OF SYSTEMS:  Psychiatric symptoms being monitored for:  depression, ADHD, anxiety  Appetite:decreased   Sleep: improved   Neuro: denies   Endocrine: Thyroid nodules     There were no vitals taken for this visit. Side Effects:  none    MENTAL STATUS EXAM:   Sensorium  oriented to time, place and person   Relations cooperative   Appearance:  age appropriate and within normal Limits   Motor Behavior:  within normal limits   Speech:  normal pitch and normal volume   Thought Process: within normal limits   Thought Content free of delusions and free of hallucinations   Suicidal ideations none   Homicidal ideations none   Mood:  euthymic   Affect:  euthymic   Memory recent  adequate   Memory remote:  adequate   Concentration:  adequate   Abstraction:  abstract   Insight:  good   Reliability good   Judgment:  good     MEDICAL DECISION MAKING:  Problems addressed today:    ICD-10-CM ICD-9-CM    1. Mood disorder ([facility])  F39 296.90 ARIPiprazole (ABILIFY) 2 mg tablet      buPROPion XL (WELLBUTRIN XL) 150 mg tablet   2. Anxiety  F41.9 300.00 propranoloL (INDERAL) 10 mg tablet   3. Anxiety and depression  F41.9 300.00 diazePAM (Valium) 5 mg tablet    F32. A 311        Assessment:   [facility] is responding to treatment. Symptoms are less in severity and less in occurrence.  Patient reports she is doing well overall. She started a new job three weeks ago. She is going to her daughter's prom and she is happy. She did report she didn't get her Propranolol prescription last month. I will have to look into it. Otherwise will not make any changes to medications. Discussed current medications and dosages. Reviewed treatment goals and target symptoms to monitor for. Pt denies SI/HI/AH/VH and delusions. Plan:   1. Current Outpatient Medications   Medication Sig Dispense Refill    ARIPiprazole (ABILIFY) 2 mg tablet Take 1 Tablet by mouth daily. 30 Tablet 2    buPROPion XL (WELLBUTRIN XL) 150 mg tablet Take 2 Tablets by mouth daily. 60 Tablet 2    propranoloL (INDERAL) 10 mg tablet Take 1 Tablet by mouth two (2) times a day. 60 Tablet 2    diazePAM (Valium) 5 mg tablet Take 1 Tablet by mouth every twelve (12) hours as needed for Anxiety. Max Daily Amount: 10 mg. 60 Tablet 0    glecaprevir-pibrentasvir (Mavyret) 100-40 mg tab Take 3 Tablets by mouth daily. Indications: chronic infection of genotype 3 hepatitis C virus 84 Tablet 1    pantoprazole (PROTONIX) 40 mg tablet Take 1 Tablet by mouth daily. 30 Tablet 2          medication changes made today: Cont Abilify 2 mg, Cont Wellbutrin 150 mg XL, cont Inderal 10 mg BID as needed, cont Valium 5 mg every 12 hours as needed. 2.  Counseling and coordination of care including instructions for treatment, risks/benefits, risk factor reduction and patient/family education. She agrees with the plan. Patient instructed to call with any side effects, questions or issues. Zacarias Denison, was evaluated through a synchronous (real-time) audio-video encounter. The patient (or guardian if applicable) is aware that this is a billable service, which includes applicable co-pays. Verbal consent to proceed has been obtained.  The visit was conducted pursuant to the emergency declaration under the [facility]5 waiver authority and the Coronavirus Preparedness and Response Supplemental Appropriations Act. Patient identification was verified, and a caregiver was present when appropriate. The patient was located at home in a state where the provider was licensed to provide care. An electronic signature was used to authenticate this note.   -- Nikki Bernabe NP .       5/12/2022

## 2022-05-12 NOTE — TELEPHONE ENCOUNTER
Message stated that pt was calling for her pending results but when I called the number and the ext that as provided,  the voicemail was for a nurse at a doctor's offfice. Therefore, a message was left for clarity.

## 2022-05-13 NOTE — TELEPHONE ENCOUNTER
Returned and left a message on Jennie's voicemail to make her aware that the requested records were electronically faxed to the requested number this morning.

## 2022-06-23 ENCOUNTER — OFFICE VISIT (OUTPATIENT)
Dept: ENDOCRINOLOGY | Age: 42
End: 2022-06-23
Payer: MEDICAID

## 2022-06-23 VITALS
WEIGHT: 206 LBS | RESPIRATION RATE: 19 BRPM | SYSTOLIC BLOOD PRESSURE: 114 MMHG | HEIGHT: 69 IN | BODY MASS INDEX: 30.51 KG/M2 | TEMPERATURE: 98 F | DIASTOLIC BLOOD PRESSURE: 75 MMHG | HEART RATE: 80 BPM | OXYGEN SATURATION: 98 %

## 2022-06-23 DIAGNOSIS — E04.1 THYROID NODULE: ICD-10-CM

## 2022-06-23 DIAGNOSIS — R61 SWEATING INCREASE: ICD-10-CM

## 2022-06-23 DIAGNOSIS — E04.1 THYROID NODULE: Primary | ICD-10-CM

## 2022-06-23 PROCEDURE — 99214 OFFICE O/P EST MOD 30 MIN: CPT | Performed by: INTERNAL MEDICINE

## 2022-06-23 NOTE — PROGRESS NOTES
Chief Complaint   Patient presents with    Thyroid Problem     History of Present Illness: Noman Joseph is a 37 y.o. female with a past medical hx significant for bipolar 1 do, GERD, chronic hep c seen in referral from Randolph Templeton MD for discussion related to a toxic nodule. Initial visit:  Sherin Cee MD for hep c treatment- has second appt with Dr. Shane Goel MD ENT on 28th of April- endorses feeling blockage from the nodule that improves with re-adjustment of positioning- denies dysphagia. Was started on propranolol for anxiety by Randolph Templeton MD. Weight has dropped from 222-->211 has been going to the gym, chronic diarrhea. Endorses tremor. No known family hx of nodules. Endorses severe anxiety and \"like I'm going to crawl out of my skin\". Has not used since Sept. Was wondering if injecting into the vein at the base of the nodule contributed to enlargement- did have vocal cord paralysis for 1 day following injection in the area. 06/23/2022: Endorses irregular menstrual periods and hot flashes that cause soaking through her sheets. Is wondering if her thyroid has anything to do with this. Has not used since September. Surgery is in late July. Avoids taking her anxiety medications. Current Outpatient Medications   Medication Sig    ARIPiprazole (ABILIFY) 2 mg tablet Take 1 Tablet by mouth daily.  buPROPion XL (WELLBUTRIN XL) 150 mg tablet Take 2 Tablets by mouth daily.  propranoloL (INDERAL) 10 mg tablet Take 1 Tablet by mouth two (2) times a day.  diazePAM (Valium) 5 mg tablet Take 1 Tablet by mouth every twelve (12) hours as needed for Anxiety. Max Daily Amount: 10 mg.  pantoprazole (PROTONIX) 40 mg tablet Take 1 Tablet by mouth daily.  glecaprevir-pibrentasvir (Mavyret) 100-40 mg tab Take 3 Tablets by mouth daily.  Indications: chronic infection of genotype 3 hepatitis C virus (Patient not taking: Reported on 6/23/2022)     No current facility-administered medications for this visit. No Known Allergies  Family History   Problem Relation Age of Onset    OSTEOARTHRITIS Mother     Drug Abuse Mother     Drug Abuse Father     Cancer Paternal Grandfather        Social Hx: hasn't injected drugs since Sept 2021  MJ daily    Review of Systems:  - See HPI    - Physical Examination:  Visit Vitals  /75 (BP 1 Location: Left upper arm, BP Patient Position: Sitting, BP Cuff Size: Adult)   Pulse 80   Temp 98 °F (36.7 °C) (Oral)   Resp 19   Ht 5' 9\" (1.753 m)   Wt 206 lb (93.4 kg)   LMP 06/13/2022   SpO2 98%   BMI 30.42 kg/m²     -   - - GENERAL: NCAT, Appears less anxious than last time  - EYES: EOMI, non-icteric, no proptosis   - Ear/Nose/Throat: L sided thyroid nodule is visible, injected at the base not in the actual nodule  - CARDIOVASCULAR: no cyanosis, no visible JVD   - RESPIRATORY: respiratory effort normal without any distress or labored breathing   - MUSCULOSKELETAL: Normal ROM of neck and upper extremities observed   - SKIN: No rash on face  - NEUROLOGIC:   Tremor is absent  - PSYCHIATRIC: Normal affect, Normal insight and judgement     Data Reviewed:         Assessment/Plan: This is a very pleasant 36 yo female with a past medical hx significant for chronic hep C, bipolar 1 d/o seen in follow-up for discussion related to a toxic nodule with esophageal/tracheal deviation. FNA is benign, pt is biochemically Euthyroid. Obtain TFTs 8 weeks postop along with estradiol levels given hot flashes. TSI r/o grave's disease.      #toxic nodule with tracheal/esophageal deviation  -TSI normal, biochemically Euthyroid  -Planning to undergo a left-sided hemithyroidectomy in late July  -obtain TSH/Free T4 8 weeks post-op to ensure R side of thyroid assumes role of both sides    #Hot flashes  -Obtain FSH/LH, estradiol levels    Copy sent to:Autumn Strong MD, Sandhya Ness MD    RTC December    Deborah De La Torre [facility]

## 2022-06-23 NOTE — PROGRESS NOTES
Identified pt with two pt identifiers(name and ). Reviewed record in preparation for visit and have obtained necessary documentation. Chief Complaint   Patient presents with    Thyroid Problem      Vitals:    22 0943   BP: 114/75   Pulse: 80   Resp: 19   Temp: 98 °F (36.7 °C)   TempSrc: Oral   SpO2: 98%   Weight: 206 lb (93.4 kg)   Height: 5' 9\" (1.753 m)   PainSc:   0 - No pain   LMP: 2022       No Known Allergies    Current Outpatient Medications   Medication Instructions    ARIPiprazole (ABILIFY) 2 mg, Oral, DAILY    buPROPion XL (WELLBUTRIN XL) 300 mg, Oral, DAILY    diazePAM (VALIUM) 5 mg, Oral, EVERY 12 HOURS AS NEEDED    glecaprevir-pibrentasvir (Mavyret) 100-40 mg tab 3 Tablets, Oral, DAILY    pantoprazole (PROTONIX) 40 mg, Oral, DAILY    propranoloL (INDERAL) 10 mg, Oral, 2 TIMES DAILY       Health Maintenance Review: Patient reminded of \"due or due soon\" health maintenance. I have asked the patient to contact his/her primary care provider (PCP) for follow-up on his/her health maintenance. There is no immunization history on file for this patient. Coordination of Care Questionnaire:  :   1) Have you been to an emergency room, urgent care, or hospitalized since your last visit? If yes, where when, and reason for visit? no       2. Have seen or consulted any other health care provider since your last visit? If yes, where when, and reason for visit? NO      Patient is accompanied by self I have received verbal consent from Zahraa to discuss any/all medical information while they are present in the room.

## 2022-07-05 DIAGNOSIS — F32.A ANXIETY AND DEPRESSION: ICD-10-CM

## 2022-07-05 DIAGNOSIS — F41.9 ANXIETY AND DEPRESSION: ICD-10-CM

## 2022-07-05 NOTE — TELEPHONE ENCOUNTER
Patient is requesting a refill:    Requested Prescriptions     Pending Prescriptions Disp Refills    diazePAM (Valium) 5 mg tablet 60 Tablet 0     Sig: Take 1 Tablet by mouth every twelve (12) hours as needed for Anxiety. Max Daily Amount: 10 mg.

## 2022-07-06 RX ORDER — DIAZEPAM 5 MG/1
5 TABLET ORAL
Qty: 60 TABLET | Refills: 0 | Status: SHIPPED | OUTPATIENT
Start: 2022-07-06 | End: 2022-07-19

## 2022-07-06 NOTE — TELEPHONE ENCOUNTER
Last visit: 05.12.22  Next visit: 07.19.22    :  05/12/2022 05/12/2022 1 Diazepam 5 Mg Tablet 60.00 30 [place]   04/12/2022 04/12/2022 1 Diazepam 5 Mg Tablet 60.00 30 [place]

## 2022-07-19 ENCOUNTER — OFFICE VISIT (OUTPATIENT)
Dept: INTERNAL MEDICINE CLINIC | Age: 42
End: 2022-07-19
Payer: MEDICARE

## 2022-07-19 ENCOUNTER — OFFICE VISIT (OUTPATIENT)
Dept: BEHAVIORAL/MENTAL HEALTH CLINIC | Age: 42
End: 2022-07-19

## 2022-07-19 VITALS
BODY MASS INDEX: 31.07 KG/M2 | DIASTOLIC BLOOD PRESSURE: 73 MMHG | HEIGHT: 69 IN | SYSTOLIC BLOOD PRESSURE: 100 MMHG | WEIGHT: 209.8 LBS | RESPIRATION RATE: 17 BRPM | HEART RATE: 56 BPM | OXYGEN SATURATION: 100 % | TEMPERATURE: 97.1 F

## 2022-07-19 VITALS
WEIGHT: 208 LBS | OXYGEN SATURATION: 98 % | HEART RATE: 48 BPM | SYSTOLIC BLOOD PRESSURE: 118 MMHG | RESPIRATION RATE: 16 BRPM | BODY MASS INDEX: 30.81 KG/M2 | DIASTOLIC BLOOD PRESSURE: 76 MMHG | TEMPERATURE: 98 F | HEIGHT: 69 IN

## 2022-07-19 DIAGNOSIS — F32.A ANXIETY AND DEPRESSION: ICD-10-CM

## 2022-07-19 DIAGNOSIS — F41.9 ANXIETY: ICD-10-CM

## 2022-07-19 DIAGNOSIS — Z01.818 PREOP GENERAL PHYSICAL EXAM: Primary | ICD-10-CM

## 2022-07-19 DIAGNOSIS — E04.2 MULTINODULAR GOITER: ICD-10-CM

## 2022-07-19 DIAGNOSIS — F39 MOOD DISORDER (HCC): ICD-10-CM

## 2022-07-19 DIAGNOSIS — F39 MOOD DISORDER (HCC): Primary | ICD-10-CM

## 2022-07-19 DIAGNOSIS — F41.9 ANXIETY AND DEPRESSION: ICD-10-CM

## 2022-07-19 DIAGNOSIS — Z86.19 HISTORY OF TYPE C VIRAL HEPATITIS: ICD-10-CM

## 2022-07-19 PROCEDURE — 99214 OFFICE O/P EST MOD 30 MIN: CPT | Performed by: NURSE PRACTITIONER

## 2022-07-19 PROCEDURE — G8417 CALC BMI ABV UP PARAM F/U: HCPCS | Performed by: FAMILY MEDICINE

## 2022-07-19 PROCEDURE — 99396 PREV VISIT EST AGE 40-64: CPT | Performed by: FAMILY MEDICINE

## 2022-07-19 PROCEDURE — G9717 DOC PT DX DEP/BP F/U NT REQ: HCPCS | Performed by: FAMILY MEDICINE

## 2022-07-19 RX ORDER — DIAZEPAM 10 MG/1
10 TABLET ORAL
Qty: 60 TABLET | Refills: 2 | Status: SHIPPED | OUTPATIENT
Start: 2022-07-19 | End: 2022-10-07 | Stop reason: SDUPTHER

## 2022-07-19 RX ORDER — BUPROPION HYDROCHLORIDE 150 MG/1
300 TABLET ORAL DAILY
Qty: 60 TABLET | Refills: 2 | Status: SHIPPED | OUTPATIENT
Start: 2022-07-19 | End: 2022-10-07 | Stop reason: SDUPTHER

## 2022-07-19 RX ORDER — ARIPIPRAZOLE 2 MG/1
2 TABLET ORAL DAILY
Qty: 30 TABLET | Refills: 2 | Status: SHIPPED | OUTPATIENT
Start: 2022-07-19 | End: 2022-10-07 | Stop reason: SDUPTHER

## 2022-07-19 RX ORDER — PROPRANOLOL HYDROCHLORIDE 10 MG/1
10 TABLET ORAL 2 TIMES DAILY
Qty: 60 TABLET | Refills: 2 | Status: SHIPPED | OUTPATIENT
Start: 2022-07-19 | End: 2022-10-07 | Stop reason: SDUPTHER

## 2022-07-19 NOTE — PROGRESS NOTES
Chief Complaint   Patient presents with    Medication Management     Visit Vitals  /73 (BP 1 Location: Right upper arm, BP Patient Position: Sitting, BP Cuff Size: Adult)   Pulse (!) 56   Temp 97.1 °F (36.2 °C) (Temporal)   Resp 17   Ht 5' 9\" (1.753 m)   Wt 95.2 kg (209 lb 12.8 oz)   SpO2 100%   BMI 30.98 kg/m²     Provider to review medication    3 most recent PHQ Screens 7/19/2022   Little interest or pleasure in doing things Several days   Feeling down, depressed, irritable, or hopeless Several days   Total Score PHQ 2 2   Trouble falling or staying asleep, or sleeping too much -   Feeling tired or having little energy -   Poor appetite, weight loss, or overeating -   Feeling bad about yourself - or that you are a failure or have let yourself or your family down -   Trouble concentrating on things such as school, work, reading, or watching TV -   Moving or speaking so slowly that other people could have noticed; or the opposite being so fidgety that others notice -   Thoughts of being better off dead, or hurting yourself in some way -   PHQ 9 Score -   How difficult have these problems made it for you to do your work, take care of your home and get along with others -     1. Have you been to the ER, urgent care clinic since your last visit? Hospitalized since your last visit? No    2. Have you seen or consulted any other health care providers outside of the [address] [place] [address] since your last visit? Include any pap smears or colon screening.  No

## 2022-07-19 NOTE — PROGRESS NOTES
Identified pt with two pt identifiers(name and ). Chief Complaint   Patient presents with    Pre-op Exam     2022 Thyroid       Vitals:    22 1546   BP: 118/76   Pulse: (!) 48   Resp: 16   Temp: 98 °F (36.7 °C)   TempSrc: Oral   SpO2: 98%   Weight: 208 lb (94.3 kg)   Height: 5' 9\" (1.753 m)   PainSc:   0 - No pain      Health Maintenance Due   Topic    COVID-19 Vaccine (1)    Pneumococcal 0-64 years (1 - PCV)    DTaP/Tdap/Td series (1 - Tdap)    Lipid Screen        Depression Screening:  :     3 most recent PHQ Screens 2022   Little interest or pleasure in doing things Not at all   Feeling down, depressed, irritable, or hopeless Not at all   Total Score PHQ 2 0   Trouble falling or staying asleep, or sleeping too much -   Feeling tired or having little energy -   Poor appetite, weight loss, or overeating -   Feeling bad about yourself - or that you are a failure or have let yourself or your family down -   Trouble concentrating on things such as school, work, reading, or watching TV -   Moving or speaking so slowly that other people could have noticed; or the opposite being so fidgety that others notice -   Thoughts of being better off dead, or hurting yourself in some way -   PHQ 9 Score -   How difficult have these problems made it for you to do your work, take care of your home and get along with others -        Fall Risk Assessment:  :     No flowsheet data found. Abuse Screening:  :     No flowsheet data found. Coordination of Care Questionnaire:  :     1. Have you been to the ER, urgent care clinic since your last visit? Hospitalized since your last visit? No    2. Have you seen or consulted any other health care providers outside of the [address] [place] [address] since your last visit? Include any pap smears or colon screening.   No

## 2022-07-19 NOTE — PROGRESS NOTES
CHIEF COMPLAINT:  Eduardo Curran is a 40 y.o. female and was seen today for follow-up of psychiatric condition and psychotropic medication management. HPI:    Jayleen reports the following psychiatric symptoms by hx:  depression and anxiety.  Overall symptoms have been present for years. Currently symptoms are  of moderate severity. The symptoms occur contantly.  Pt reports medications are effective. Met with pt today  to review current treatment plan. She consents to virtual apt. FAMILY/SOCIAL HX:   Psychosocial Stressors  Upcoming Surgery 7/29       REVIEW OF SYSTEMS:  Psychiatric symptoms being monitored for:  depression, ADHD, anxiety  Appetite:decreased   Sleep: improved   Neuro: denies   Endocrine: Thyroid nodules     Visit Vitals  /73 (BP 1 Location: Right upper arm, BP Patient Position: Sitting, BP Cuff Size: Adult)   Pulse (!) 56   Temp 97.1 °F (36.2 °C) (Temporal)   Resp 17   Ht 5' 9\" (1.753 m)   Wt 95.2 kg (209 lb 12.8 oz)   SpO2 100%   BMI 30.98 kg/m²       Side Effects:  none    MENTAL STATUS EXAM:   Sensorium  oriented to time, place and person   Relations cooperative   Appearance:  age appropriate and within normal Limits   Motor Behavior:  within normal limits   Speech:  normal pitch and normal volume   Thought Process: within normal limits   Thought Content free of delusions and free of hallucinations   Suicidal ideations none   Homicidal ideations none   Mood:  euthymic   Affect:  euthymic   Memory recent  adequate   Memory remote:  adequate   Concentration:  adequate   Abstraction:  abstract   Insight:  good   Reliability good   Judgment:  good       MEDICAL DECISION MAKING:  Problems addressed today:    ICD-10-CM ICD-9-CM    1. Mood disorder (HCC)  F39 296.90 ARIPiprazole (ABILIFY) 2 mg tablet      buPROPion XL (WELLBUTRIN XL) 150 mg tablet   2. Anxiety and depression  F41.9 300.00     F32. A 311    3.  Anxiety  F41.9 300.00 propranoloL (INDERAL) 10 mg tablet         Assessment: [facility] is responding to treatment. Symptoms are exacerbated, precipitated by upcoming surgery. Her daughter has moved in and this also causing a little more stress. Patient reports anxiety is affecting work. Discussed current medications and dosages. Will increase Valium to 10 mg. Reviewed risks vs benefits. Reviewed treatment goals and target symptoms to monitor for. Reinforced positive coping strategies. Plan:   1. Current Outpatient Medications   Medication Sig Dispense Refill    ARIPiprazole (ABILIFY) 2 mg tablet Take 1 Tablet by mouth daily. 30 Tablet 2    buPROPion XL (WELLBUTRIN XL) 150 mg tablet Take 2 Tablets by mouth daily. 60 Tablet 2    propranoloL (INDERAL) 10 mg tablet Take 1 Tablet by mouth two (2) times a day. 60 Tablet 2    diazePAM (Valium) 5 mg tablet Take 1 Tablet by mouth every twelve (12) hours as needed for Anxiety. Max Daily Amount: 10 mg. 60 Tablet 0    pantoprazole (PROTONIX) 40 mg tablet Take 1 Tablet by mouth daily. 30 Tablet 2          medication changes made today:Valium 10 mg BID     2. Counseling and coordination of care including instructions for treatment, risks/benefits, risk factor reduction and patient/family education. She agrees with the plan. Patient instructed to call with any side effects, questions or issues.           7/19/2022  Raffy Charles NP

## 2022-07-21 NOTE — PROGRESS NOTES
SPORTS MEDICINE AND PRIMARY CARE  Lieutenant Mitchell. MD Ligia  [address] [place] [address]    Chief Complaint   Patient presents with    Pre-op Exam     7/29/2022 Thyroid        SUBJECTIVE:    Jesus Multani is a 40 y.o. female for preoperative evaluation. She does not bring in a preoperative form. She is scheduled for a left thyroidectomy before the end of July. She has had pain and swelling in the let side of her neck. 9/2021 Ct claudia showed large nodular left thyroid lobe with tracheal deviation. PMH of chronic hepatitis C. gerd and anxiety/ depression. Current Outpatient Medications   Medication Sig Dispense Refill    ARIPiprazole (ABILIFY) 2 mg tablet Take 1 Tablet by mouth daily. 30 Tablet 2    buPROPion XL (WELLBUTRIN XL) 150 mg tablet Take 2 Tablets by mouth daily. 60 Tablet 2    propranoloL (INDERAL) 10 mg tablet Take 1 Tablet by mouth two (2) times a day. 60 Tablet 2    diazePAM (Valium) 10 mg tablet Take 1 Tablet by mouth every twelve (12) hours as needed for Anxiety. Max Daily Amount: 20 mg. 60 Tablet 2    pantoprazole (PROTONIX) 40 mg tablet Take 1 Tablet by mouth daily.  30 Tablet 2     Past Medical History:   Diagnosis Date    GERD (gastroesophageal reflux disease)     Goiter 3/4/2022    Thyroid nodule      Past Surgical History:   Procedure Laterality Date    HX HEENT      HX ORTHOPAEDIC Left     plates    HX TUBAL LIGATION      HX WISDOM TEETH EXTRACTION Bilateral 1999     No Known Allergies    REVIEW OF SYSTEMS:  General: negative for - chills or fever  ENT: negative for - headaches, nasal congestion, tinnitus, hearing loss, vision changes, sore throat  Respiratory: negative for - cough, hemoptysis, shortness of breath or wheezing  Cardiovascular : negative for - chest pain, edema, palpitations or shortness of breath  Gastrointestinal: negative for - abdominal pain, blood in stools, heartburn or nausea/vomiting, diarrhea, constipation  Genito-Urinary: no dysuria, trouble voiding, hematuria Musculoskeletal: negative for - gait disturbance, joint pain, joint stiffness , joint swelling, muscle aches  Neurological: no TIA or stroke symptoms  Hematologic: no bruises, no bleeding, no swollen glands  Integument: no lumps, mole changes, nail changes or rash  Endocrine:no malaise/lethargy or unexpected weight changes      Social History     Socioeconomic History    Marital status:    Tobacco Use    Smoking status: Former     Packs/day: 0.50     Types: Cigarettes    Smokeless tobacco: Never   Vaping Use    Vaping Use: Some days    Substances: Nicotine    Devices: Disposable   Substance and Sexual Activity    Alcohol use: Yes     Alcohol/week: 2.0 standard drinks     Types: 1 Glasses of wine, 1 Shots of liquor per week     Comment: occ    Drug use: Yes     Frequency: 7.0 times per week     Types: Marijuana     Comment: daily     Sexual activity: Not Currently     Comment: BLS     Family History   Problem Relation Age of Onset    OSTEOARTHRITIS Mother     Drug Abuse Mother     Drug Abuse Father     Cancer Paternal Grandfather        OBJECTIVE:     Visit Vitals  /76 (BP 1 Location: Right arm, BP Patient Position: Sitting, BP Cuff Size: Adult)   Pulse (!) 48   Temp 98 °F (36.7 °C) (Oral)   Resp 16   Ht 5' 9\" (1.753 m)   Wt 208 lb (94.3 kg)   SpO2 98%   BMI 30.72 kg/m²   General appearance: alert, cooperative, no distress, appears stated age  Head: Normocephalic, without obvious abnormality, atraumatic  Eyes: conjunctivae/corneas clear. PERRL, EOM's intact. Ears: normal TM's and external ear canals AU  Nose: Nares normal. Septum midline. Mucosa normal. No drainage or sinus tenderness. Throat: Lips, mucosa, and tongue normal. Teeth and gums normal  Neck: supple, symmetrical, trachea midline, no adenopathy, thyroid: left lobe is enlarged,  + left tenderness/mass/nodules, no carotid bruit and no JVD  Back: negative, symmetric, no curvature. ROM normal. No CVA tenderness.   Lungs: clear to auscultation bilaterally  Breasts: not examined  Heart: regular rate and rhythm, S1, S2 normal, no murmur, click, rub or gallop  Abdomen: soft, non-tender. Bowel sounds normal. No masses,  no organomegaly  Pelvic: deferred  Extremities: extremities normal, atraumatic, no cyanosis or edema  Pulses: 2+ and symmetric  Skin: Skin color, texture, turgor normal. No rashes or lesions  Lymph nodes: Cervical, supraclavicular, and axillary nodes normal.  Neurologic: Alert and oriented X 3, normal strength and tone. . Normal coordination and gait   No visits with results within 3 Month(s) from this visit. Latest known visit with results is:   Orders Only on 04/08/2022   Component Date Value Ref Range Status    PTH, Intact 04/12/2022 29  15 - 65 pg/mL Final    T3, total 04/12/2022 194 (A) 71 - 180 ng/dL Final    Thyroid Stim Immunoglobulin 04/12/2022 <0.10  0.00 - 0.55 IU/L Final    WBC 04/12/2022 8.5  3.4 - 10.8 x10E3/uL Final    RBC 04/12/2022 4.97  3.77 - 5.28 x10E6/uL Final    HGB 04/12/2022 15.5  11.1 - 15.9 g/dL Final    HCT 04/12/2022 46.0  34.0 - 46.6 % Final    MCV 04/12/2022 93  79 - 97 fL Final    MCH 04/12/2022 31.2  26.6 - 33.0 pg Final    MCHC 04/12/2022 33.7  31.5 - 35.7 g/dL Final    RDW 04/12/2022 13.1  11.7 - 15.4 % Final    PLATELET 68/11/2299 801  150 - 450 x10E3/uL Final    NEUTROPHILS 04/12/2022 53  Not Estab. % Final    Lymphocytes 04/12/2022 34  Not Estab. % Final    MONOCYTES 04/12/2022 9  Not Estab. % Final    EOSINOPHILS 04/12/2022 3  Not Estab. % Final    BASOPHILS 04/12/2022 1  Not Estab. % Final    ABS. NEUTROPHILS 04/12/2022 4.5  1.4 - 7.0 x10E3/uL Final    Abs Lymphocytes 04/12/2022 2.9  0.7 - 3.1 x10E3/uL Final    ABS. MONOCYTES 04/12/2022 0.8  0.1 - 0.9 x10E3/uL Final    ABS. EOSINOPHILS 04/12/2022 0.2  0.0 - 0.4 x10E3/uL Final    ABS. BASOPHILS 04/12/2022 0.1  0.0 - 0.2 x10E3/uL Final    IMMATURE GRANULOCYTES 04/12/2022 0  Not Estab. % Final    ABS. IMM.  GRANS. 04/12/2022 0.0  0.0 - 0.1 x10E3/uL Final Glucose 04/12/2022 81  65 - 99 mg/dL Final    BUN 04/12/2022 10  6 - 24 mg/dL Final    Creatinine 04/12/2022 0.86  0.57 - 1.00 mg/dL Final    eGFR 04/12/2022 88  >59 mL/min/1.73 Final    BUN/Creatinine ratio 04/12/2022 12  9 - 23 Final    Sodium 04/12/2022 136  134 - 144 mmol/L Final    Potassium 04/12/2022 5.0  3.5 - 5.2 mmol/L Final    Chloride 04/12/2022 97  96 - 106 mmol/L Final    CO2 04/12/2022 25  20 - 29 mmol/L Final    Calcium 04/12/2022 10.2  8.7 - 10.2 mg/dL Final    Protein, total 04/12/2022 7.5  6.0 - 8.5 g/dL Final    Albumin 04/12/2022 4.4  3.8 - 4.8 g/dL Final    GLOBULIN, TOTAL 04/12/2022 3.1  1.5 - 4.5 g/dL Final    A-G Ratio 04/12/2022 1.4  1.2 - 2.2 Final    Bilirubin, total 04/12/2022 1.1  0.0 - 1.2 mg/dL Final    Alk. phosphatase 04/12/2022 97  44 - 121 IU/L Final    AST (SGOT) 04/12/2022 78 (A) 0 - 40 IU/L Final    ALT (SGPT) 04/12/2022 144 (A) 0 - 32 IU/L Final    T4, Free 04/12/2022 1.26  0.82 - 1.77 ng/dL Final    TSH 04/12/2022 0.688  0.450 - 4.500 uIU/mL Final    Glucose 04/12/2022 81  65 - 99 mg/dL Final    BUN 04/12/2022 10  6 - 24 mg/dL Final    Creatinine 04/12/2022 0.86  0.57 - 1.00 mg/dL Final    eGFR 04/12/2022 88  >59 mL/min/1.73 Final    BUN/Creatinine ratio 04/12/2022 12  9 - 23 Final    Sodium 04/12/2022 136  134 - 144 mmol/L Final    Potassium 04/12/2022 5.0  3.5 - 5.2 mmol/L Final    Chloride 04/12/2022 97  96 - 106 mmol/L Final    CO2 04/12/2022 25  20 - 29 mmol/L Final    Calcium 04/12/2022 10.2  8.7 - 10.2 mg/dL Final    Protein, total 04/12/2022 7.5  6.0 - 8.5 g/dL Final    Albumin 04/12/2022 4.4  3.8 - 4.8 g/dL Final    GLOBULIN, TOTAL 04/12/2022 3.1  1.5 - 4.5 g/dL Final    A-G Ratio 04/12/2022 1.4  1.2 - 2.2 Final    Bilirubin, total 04/12/2022 1.1  0.0 - 1.2 mg/dL Final    Alk.  phosphatase 04/12/2022 97  44 - 121 IU/L Final    AST (SGOT) 04/12/2022 78 (A) 0 - 40 IU/L Final    ALT (SGPT) 04/12/2022 144 (A) 0 - 32 IU/L Final    T4, Free 04/12/2022 1.26  0.82 - 1.77 ng/dL Final    TSH 04/12/2022 0.688  0.450 - 4.500 uIU/mL Final       ASSESSMENT/PLAN:   1. Preop general physical exam -stable for thyroidectomy from PCP standpoint   2. Multinodular goiter -left thyroidectomy planned   3. Mood disorder ([address] -stable on medical mgt, followed by psychiatry   4. History of type C viral hepatitis - pt completed antiviral therapy        I have discussed the diagnosis with the patient and the intended plan as seen in the  orders above. The patient understands and agrees with the plan. The patient has   received an after visit summary. Questions were answered concerning  future plans  Patient labs and/or xrays were reviewed as available. Past records were reviewed as available. Counseled regarding  healthy lifestyle           Medication risks/benefits/costs/interactions/alternatives reviewed. Rosemary Simpson M.D. This note was created using voice recognition software.   Edits have been made but syntax errors might exist.

## 2022-08-19 NOTE — PROGRESS NOTES
ACM attempted to reach patient for follow up . Unable to reach and left  . Patient resolved from Transition of Care episode on 4/21/20. ACM/CTN was unsuccessful at contacting this patient today. Patient/family was provided the following resources and education related to COVID-19 during the initial call:                         Signs, symptoms and red flags related to COVID-19            CDC exposure and quarantine guidelines            [employer] exposure contact - [phone]            Contact for their local Department of Health                 Patient has not had any additional ED or hospital visits. No further outreach scheduled with this CTN/ACM. Episode of Care resolved. Patient has this CTN/ACM contact information if future needs arise. Attempted to contact Kevan Norris  to schedule procedure , no answer, no voicemail set up

## 2022-08-25 ENCOUNTER — TELEPHONE (OUTPATIENT)
Dept: BEHAVIORAL/MENTAL HEALTH CLINIC | Age: 42
End: 2022-08-25

## 2022-08-25 DIAGNOSIS — F32.A ANXIETY AND DEPRESSION: ICD-10-CM

## 2022-08-25 DIAGNOSIS — F41.9 ANXIETY AND DEPRESSION: ICD-10-CM

## 2022-08-25 NOTE — TELEPHONE ENCOUNTER
Patient is requesting a refill on Valium. Informed patient that she has 2 refills remaining. Patient stated Pharmacy told her they were unable to fill her prescription and that she needed to contact the office.

## 2022-09-13 ENCOUNTER — E-VISIT (OUTPATIENT)
Dept: INTERNAL MEDICINE CLINIC | Age: 42
End: 2022-09-13

## 2022-10-01 LAB
ALBUMIN SERPL-MCNC: 4.3 G/DL (ref 3.8–4.8)
ALBUMIN/GLOB SERPL: 1.4 {RATIO} (ref 1.2–2.2)
ALP SERPL-CCNC: 65 IU/L (ref 44–121)
ALT SERPL-CCNC: 39 IU/L (ref 0–32)
AST SERPL-CCNC: 33 IU/L (ref 0–40)
BILIRUB SERPL-MCNC: 0.4 MG/DL (ref 0–1.2)
BUN SERPL-MCNC: 6 MG/DL (ref 6–24)
BUN/CREAT SERPL: 8 (ref 9–23)
CALCIUM SERPL-MCNC: 9.5 MG/DL (ref 8.7–10.2)
CHLORIDE SERPL-SCNC: 100 MMOL/L (ref 96–106)
CO2 SERPL-SCNC: 25 MMOL/L (ref 20–29)
CREAT SERPL-MCNC: 0.72 MG/DL (ref 0.57–1)
EGFR: 108 ML/MIN/1.73
ESTRADIOL SERPL-MCNC: 272 PG/ML
FSH SERPL-ACNC: 3.8 MIU/ML
GLOBULIN SER CALC-MCNC: 3 G/DL (ref 1.5–4.5)
GLUCOSE SERPL-MCNC: 55 MG/DL (ref 70–99)
LH SERPL-ACNC: 5.4 MIU/ML
POTASSIUM SERPL-SCNC: 3.8 MMOL/L (ref 3.5–5.2)
PROT SERPL-MCNC: 7.3 G/DL (ref 6–8.5)
PTH-INTACT SERPL-MCNC: 23 PG/ML (ref 15–65)
SODIUM SERPL-SCNC: 138 MMOL/L (ref 134–144)
T4 FREE SERPL-MCNC: 0.98 NG/DL (ref 0.82–1.77)
TSH SERPL DL<=0.005 MIU/L-ACNC: 2.13 UIU/ML (ref 0.45–4.5)

## 2022-10-02 NOTE — LETTER
10/2/2022    Ms. Lavon Jasmine  [address] [place] [address]      Dear Lavon Jasmine:    Please find your most recent results below. Resulted Orders   TSH 3RD GENERATION   Result Value Ref Range    TSH 2.130 0.450 - 4.500 uIU/mL    Narrative    Performed at:  [facility] [address]  [id number]  : Kolby Casey MD, Phone:  [phone]   T4, FREE   Result Value Ref Range    T4, Free 0.98 0.82 - 1.77 ng/dL    Narrative    Performed at:  [facility] [address]  [id number]  : Kolby Casey MD, Phone:  [phone]   ESTRADIOL   Result Value Ref Range    Estradiol 272.0 pg/mL    Narrative    Performed at:  [address] [place] [address]  [id number]  : Kolby Casey MD, Phone:  [phone]   [facility]   Result Value Ref Range    Luteinizing hormone 5.4 mIU/mL    FSH 3.8 mIU/mL    Narrative    Performed at:  [facility] [address]  [id number]  : Kolby Casey MD, Phone:  [phone]   PTH INTACT   Result Value Ref Range    PTH, Intact 23 15 - 65 pg/mL    Narrative    Performed at:  [facility] [address]  [id number]  : Kolby Casey MD, Phone:  [phone]   METABOLIC PANEL, COMPREHENSIVE   Result Value Ref Range    Glucose 55 (L) 70 - 99 mg/dL    BUN 6 6 - 24 mg/dL    Creatinine 0.72 0.57 - 1.00 mg/dL    eGFR 108 >59 mL/min/1.73    BUN/Creatinine ratio 8 (L) 9 - 23    Sodium 138 134 - 144 mmol/L    Potassium 3.8 3.5 - 5.2 mmol/L    Chloride 100 96 - 106 mmol/L    CO2 25 20 - 29 mmol/L    Calcium 9.5 8.7 - 10.2 mg/dL    Protein, total 7.3 6.0 - 8.5 g/dL    Albumin 4.3 3.8 - 4.8 g/dL    GLOBULIN, TOTAL 3.0 1.5 - 4.5 g/dL    A-G Ratio 1.4 1.2 - 2.2    Bilirubin, total 0.4 0.0 - 1.2 mg/dL    Alk.  phosphatase 65 44 - 121 IU/L    AST (SGOT) 33 0 - 40 IU/L    ALT (SGPT) 39 (H) 0 - 32 IU/L    Narrative    Performed at:  [facility]  [address]  [id number]  : Cielo Amaya MD, Phone:  [phone]       RECOMMENDATIONS:    Florenciorey Paoevaria,    Your thyroid level is normal, your ovaries are still working (you're not in menopause), your liver numbers are stable. Let's discuss the hot flashes more at your upcoming visit.     Please call me if you have any questions: [phone]    Sincerely,      Marissa Ray MD

## 2022-10-07 ENCOUNTER — VIRTUAL VISIT (OUTPATIENT)
Dept: BEHAVIORAL/MENTAL HEALTH CLINIC | Age: 42
End: 2022-10-07
Payer: MEDICAID

## 2022-10-07 DIAGNOSIS — F41.9 ANXIETY: ICD-10-CM

## 2022-10-07 DIAGNOSIS — F32.A ANXIETY AND DEPRESSION: ICD-10-CM

## 2022-10-07 DIAGNOSIS — F41.9 ANXIETY AND DEPRESSION: ICD-10-CM

## 2022-10-07 DIAGNOSIS — F39 MOOD DISORDER (HCC): Primary | ICD-10-CM

## 2022-10-07 PROCEDURE — 99214 OFFICE O/P EST MOD 30 MIN: CPT | Performed by: NURSE PRACTITIONER

## 2022-10-07 RX ORDER — DIAZEPAM 10 MG/1
10 TABLET ORAL
Qty: 60 TABLET | Refills: 2 | Status: SHIPPED | OUTPATIENT
Start: 2022-10-07 | End: 2022-11-04

## 2022-10-07 RX ORDER — PROPRANOLOL HYDROCHLORIDE 10 MG/1
10 TABLET ORAL 2 TIMES DAILY
Qty: 60 TABLET | Refills: 2 | Status: SHIPPED | OUTPATIENT
Start: 2022-10-07

## 2022-10-07 RX ORDER — VENLAFAXINE HYDROCHLORIDE 37.5 MG/1
37.5 CAPSULE, EXTENDED RELEASE ORAL DAILY
Qty: 30 CAPSULE | Refills: 1 | Status: SHIPPED | OUTPATIENT
Start: 2022-10-07 | End: 2022-11-04 | Stop reason: SINTOL

## 2022-10-07 RX ORDER — ARIPIPRAZOLE 2 MG/1
2 TABLET ORAL DAILY
Qty: 30 TABLET | Refills: 2 | Status: SHIPPED | OUTPATIENT
Start: 2022-10-07

## 2022-10-07 RX ORDER — BUPROPION HYDROCHLORIDE 150 MG/1
300 TABLET ORAL DAILY
Qty: 60 TABLET | Refills: 2 | Status: SHIPPED | OUTPATIENT
Start: 2022-10-07

## 2022-10-07 NOTE — PROGRESS NOTES
CHIEF COMPLAINT:  Svetlana Leon is a 40 y.o. female and was seen today for follow-up of psychiatric condition and psychotropic medication management. HPI:    [facility] reports the following psychiatric symptoms by hx:  depression and anxiety. Overall symptoms have been present for years. Currently symptoms are  of moderate severity. The symptoms occur contantly. Pt reports medications are effective, but patient reports she is having an increase in depressive symptoms. She is experiencing fatigue, lack of motivation, isolation, and sadness. Met with pt today via telehealth to review current treatment plan. She consents to virtual apt. FAMILY/SOCIAL HX:   Psychosocial Stressors      REVIEW OF SYSTEMS:  Psychiatric symptoms being monitored for:  depression, ADHD, anxiety  Appetite:decreased   Sleep: improved   Neuro: denies   Endocrine: recent surgery        There were no vitals taken for this visit. Side Effects:  none    MENTAL STATUS EXAM:   Sensorium  oriented to time, place and person   Relations cooperative   Appearance:  age appropriate and within normal Limits   Motor Behavior:  within normal limits   Speech:  normal pitch and normal volume   Thought Process: within normal limits   Thought Content free of delusions and free of hallucinations   Suicidal ideations none   Homicidal ideations none   Mood:  Anxious/ depressed    Affect:  depressed   Memory recent  adequate   Memory remote:  adequate   Concentration:  adequate   Abstraction:  abstract   Insight:  good   Reliability good   Judgment:  good     MEDICAL DECISION MAKING:  Problems addressed today:    ICD-10-CM ICD-9-CM    1. Mood disorder ([facility])  F39 296.90 venlafaxine-SR (EFFEXOR-XR) 37.5 mg capsule      ARIPiprazole (ABILIFY) 2 mg tablet      buPROPion XL (WELLBUTRIN XL) 150 mg tablet      2. Anxiety  F41.9 300.00 propranoloL (INDERAL) 10 mg tablet      3. Anxiety and depression  F41.9 300.00 diazePAM (Valium) 10 mg tablet    F32. A 311 Assessment:   [facility] is responding to treatment. Symptoms are exacerbated. Patient is doing well with work, but working a lot. She has been feeling more depressed and anxiety continues to be a concern. Discussed current medications and dosages. Will start Effexor 37.5 mg , with a plan to taper off Wellbutrin as it may be causing anxiety. Risks vs benefits and side effects reviewed. Reinforced positive coping strategies. Reviewed treatment goals and target symptoms to monitor for. Plan:   1. Current Outpatient Medications   Medication Sig Dispense Refill    venlafaxine-SR (EFFEXOR-XR) 37.5 mg capsule Take 1 Capsule by mouth daily. Indications: major depressive disorder 30 Capsule 1    ARIPiprazole (ABILIFY) 2 mg tablet Take 1 Tablet by mouth daily. 30 Tablet 2    buPROPion XL (WELLBUTRIN XL) 150 mg tablet Take 2 Tablets by mouth daily. 60 Tablet 2    propranoloL (INDERAL) 10 mg tablet Take 1 Tablet by mouth two (2) times a day. 60 Tablet 2    diazePAM (Valium) 10 mg tablet Take 1 Tablet by mouth every twelve (12) hours as needed for Anxiety. Max Daily Amount: 20 mg. 60 Tablet 2    pantoprazole (PROTONIX) 40 mg tablet Take 1 Tablet by mouth daily. 30 Tablet 2          medication changes made today: Effexor 37.5 mg     2. Counseling and coordination of care including instructions for treatment, risks/benefits, risk factor reduction and patient/family education. She agrees with the plan. Patient instructed to call with any side effects, questions or issues. Odalys Mariano, was evaluated through a synchronous (real-time) audio-video encounter. The patient (or guardian if applicable) is aware that this is a billable service, which includes applicable co-pays. This Virtual Visit was conducted with patient's (and/or legal guardian's) consent.  The visit was conducted pursuant to the emergency declaration under the [facility], 1135 waiver authority and the Coronavirus Preparedness and Response Supplemental Appropriations Act. Patient identification was verified, and a caregiver was present when appropriate. The patient was located at: Home: [address] [place] [address]  The provider was located at: Facility (Appt Department): [address] [place] [address],[address]       An electronic signature was used to authenticate this note.   -- Derrick Carlson NP       10/7/2022

## 2022-10-11 ENCOUNTER — OFFICE VISIT (OUTPATIENT)
Dept: ENDOCRINOLOGY | Age: 42
End: 2022-10-11
Payer: COMMERCIAL

## 2022-10-11 VITALS
BODY MASS INDEX: 30.02 KG/M2 | OXYGEN SATURATION: 97 % | HEIGHT: 69 IN | WEIGHT: 202.7 LBS | RESPIRATION RATE: 16 BRPM | DIASTOLIC BLOOD PRESSURE: 72 MMHG | SYSTOLIC BLOOD PRESSURE: 116 MMHG | HEART RATE: 54 BPM

## 2022-10-11 DIAGNOSIS — R61 SWEATING INCREASE: Primary | ICD-10-CM

## 2022-10-11 PROCEDURE — 99214 OFFICE O/P EST MOD 30 MIN: CPT | Performed by: INTERNAL MEDICINE

## 2022-10-11 RX ORDER — GABAPENTIN 100 MG/1
100 CAPSULE ORAL EVERY EVENING
Qty: 90 CAPSULE | Refills: 0 | Status: SHIPPED | OUTPATIENT
Start: 2022-10-11

## 2022-10-11 NOTE — PROGRESS NOTES
Chief Complaint   Patient presents with    Follow-up    Thyroid Problem     History of Present Illness: Trever Lobato is a 40 y.o. female with a past medical hx significant for bipolar 1 do, GERD, chronic hep c seen in referral from Agustin Garcia MD for discussion related to a toxic nodule. Initial visit:  Rodney Pinto MD for hep c treatment- has second appt with Dr. Angela Salas MD ENT on 28th of April- endorses feeling blockage from the nodule that improves with re-adjustment of positioning- denies dysphagia. Was started on propranolol for anxiety by Agustin Garcia MD. Weight has dropped from 222-->211 has been going to the gym, chronic diarrhea. Endorses tremor. No known family hx of nodules. Endorses severe anxiety and \"like I'm going to crawl out of my skin\". Has not used since Sept. Was wondering if injecting into the vein at the base of the nodule contributed to enlargement- did have vocal cord paralysis for 1 day following injection in the area. 06/23/2022: Endorses irregular menstrual periods and hot flashes that cause soaking through her sheets. Is wondering if her thyroid has anything to do with this. Has not used since September. Surgery is in late July. Avoids taking her anxiety medications. 10/11/2022: Wellbutrin is being switched to effexor as of Monday. Sweating continues, predominantly at night-time. Anxiety is pretty bad right now, that also is being attributed to the wellbutrin. Current Outpatient Medications   Medication Sig    venlafaxine-SR (EFFEXOR-XR) 37.5 mg capsule Take 1 Capsule by mouth daily. Indications: major depressive disorder    ARIPiprazole (ABILIFY) 2 mg tablet Take 1 Tablet by mouth daily. buPROPion XL (WELLBUTRIN XL) 150 mg tablet Take 2 Tablets by mouth daily. propranoloL (INDERAL) 10 mg tablet Take 1 Tablet by mouth two (2) times a day. diazePAM (Valium) 10 mg tablet Take 1 Tablet by mouth every twelve (12) hours as needed for Anxiety.  Max Daily Amount: 20 mg.    pantoprazole (PROTONIX) 40 mg tablet Take 1 Tablet by mouth daily. No current facility-administered medications for this visit. Social Hx: hasn't injected drugs since Sept 2021  MJ daily    Review of Systems:  See HPI    Physical Examination:  Visit Vitals  /72   Pulse (!) 54   Resp 16   Ht 5' 9\" (1.753 m)   Wt 202 lb 11.2 oz (91.9 kg)   SpO2 97%   BMI 29.93 kg/m²       - GENERAL: NCAT, Appears less anxious than last time  - EYES: EOMI, non-icteric, no proptosis   - Ear/Nose/Throat: L sided thyroid nodule is visible, injected at the base not in the actual nodule  - CARDIOVASCULAR: no cyanosis, no visible JVD   - RESPIRATORY: respiratory effort normal without any distress or labored breathing   - MUSCULOSKELETAL: Normal ROM of neck and upper extremities observed   - SKIN: No rash on face  - NEUROLOGIC:   Tremor is absent  - PSYCHIATRIC: Normal affect, Normal insight and judgement     Data Reviewed:         Assessment/Plan: This is a very pleasant 41 yo female with a past medical hx significant for chronic hep C, bipolar 1 d/o seen in follow-up for discussion related to a toxic nodule with esophageal/tracheal deviation. Underwent L sided-hemithyroidectomy with normal TFTs thereafter. Estradiol levels are normal as well. Suspect wellbutrin may be contributing to the sx- this was switched recently. If sx do not resolve with this switch, will trial gabapentin 100mg qhs-->increase to 300mg qhs as needed.     #toxic nodule with tracheal/esophageal deviation  -s/p L sided ana-thyroidectomy  -TSH, Free T4, PTH normal post-op OFF levothyroxine    #Hot flashes  -FSH/LH, estradiol levels normal  -switching from wellbutrin to effexor  -trial gabapentin if sx don't improve with wellbutrin d/c after a few weeks  -start with 100mg qhs-->300mg qhs prn    Copy sent to:Savanna Strong MD    RTC 6 mo    Deborah Faust [address] [facility]

## 2022-10-26 ENCOUNTER — TELEPHONE (OUTPATIENT)
Dept: BEHAVIORAL/MENTAL HEALTH CLINIC | Age: 42
End: 2022-10-26

## 2022-10-26 NOTE — TELEPHONE ENCOUNTER
Patient is requesting a doctors note to excuse her from work the past 3 days. Patient stated she starting taking Effexor 5 days ago and on the second day noticed side effects. Patient stated the medication has caused her to become more depressed, uneasy, and has given her diarrhea and nausea. Patient stated she did not take the medication today and will not take it moving forward. Patient stated she did not notify the office at the onset of her symptoms because she wasn't sure she had to. Patient is requesting a call back regarding doctors note and can be reached at [phone].

## 2022-10-26 NOTE — TELEPHONE ENCOUNTER
Spoke with patient who stated she began the Effexor last Friday (10/21/22). Patient began to experience side effects almost immediately. She complains of nausea/vomiting, and increase lack of motivation. Patient asked for a note excusing herself from work. Patient was informed that the provider will discuss a note during the follow up appointment on Friday due to patient not calling within the first couple of days of experiencing side effects. Patient expressed an understanding and had no further questions.

## 2022-11-04 ENCOUNTER — VIRTUAL VISIT (OUTPATIENT)
Dept: BEHAVIORAL/MENTAL HEALTH CLINIC | Age: 42
End: 2022-11-04
Payer: COMMERCIAL

## 2022-11-04 DIAGNOSIS — F39 MOOD DISORDER (HCC): Primary | ICD-10-CM

## 2022-11-04 DIAGNOSIS — F41.9 ANXIETY AND DEPRESSION: ICD-10-CM

## 2022-11-04 DIAGNOSIS — F32.A ANXIETY AND DEPRESSION: ICD-10-CM

## 2022-11-04 PROCEDURE — 99214 OFFICE O/P EST MOD 30 MIN: CPT | Performed by: NURSE PRACTITIONER

## 2022-11-04 RX ORDER — DIAZEPAM 10 MG/1
10 TABLET ORAL
Qty: 75 TABLET | Refills: 2 | Status: SHIPPED | OUTPATIENT
Start: 2022-11-04
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
## 2022-12-01 ENCOUNTER — OFFICE VISIT (OUTPATIENT)
Dept: NEUROLOGY | Age: 42
End: 2022-12-01
Payer: MEDICAID

## 2022-12-01 DIAGNOSIS — Z86.59 HISTORY OF ADHD: ICD-10-CM

## 2022-12-01 DIAGNOSIS — F31.9 BIPOLAR 1 DISORDER (HCC): ICD-10-CM

## 2022-12-01 DIAGNOSIS — R41.3 SHORT-TERM MEMORY LOSS: ICD-10-CM

## 2022-12-01 DIAGNOSIS — F41.9 ANXIETY: ICD-10-CM

## 2022-12-01 DIAGNOSIS — R41.89 COGNITIVE DECLINE: ICD-10-CM

## 2022-12-01 DIAGNOSIS — G31.84 MILD COGNITIVE IMPAIRMENT: Primary | ICD-10-CM
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
## 2022-12-05 ENCOUNTER — TELEPHONE (OUTPATIENT)
Dept: NEUROLOGY | Age: 42
End: 2022-12-05

## 2022-12-05 ENCOUNTER — OFFICE VISIT (OUTPATIENT)
Dept: NEUROLOGY | Age: 42
End: 2022-12-05

## 2022-12-05 DIAGNOSIS — Z91.199 NO-SHOW FOR APPOINTMENT: Primary | ICD-10-CM

## 2022-12-09 ENCOUNTER — OFFICE VISIT (OUTPATIENT)
Dept: NEUROLOGY | Age: 42
End: 2022-12-09

## 2022-12-09 DIAGNOSIS — Z91.199 NO-SHOW FOR APPOINTMENT: Primary | ICD-10-CM

## 2022-12-20 ENCOUNTER — TELEPHONE (OUTPATIENT)
Dept: NEUROLOGY | Age: 42
End: 2022-12-20

## 2022-12-20 NOTE — TELEPHONE ENCOUNTER
Patient contacted. Phone rings and then goes to busy signal. Unable to leave v/m but did send a message thru [employer] for the reason for the call.

## 2023-02-27 ENCOUNTER — OFFICE VISIT (OUTPATIENT)
Dept: BEHAVIORAL/MENTAL HEALTH CLINIC | Age: 43
End: 2023-02-27
Payer: MEDICAID

## 2023-02-27 VITALS
SYSTOLIC BLOOD PRESSURE: 115 MMHG | TEMPERATURE: 98 F | HEART RATE: 61 BPM | RESPIRATION RATE: 16 BRPM | WEIGHT: 193.2 LBS | BODY MASS INDEX: 28.61 KG/M2 | HEIGHT: 69 IN | DIASTOLIC BLOOD PRESSURE: 64 MMHG

## 2023-02-27 DIAGNOSIS — F41.9 ANXIETY: ICD-10-CM

## 2023-02-27 DIAGNOSIS — F39 MOOD DISORDER (HCC): ICD-10-CM

## 2023-02-27 DIAGNOSIS — F41.9 ANXIETY AND DEPRESSION: ICD-10-CM

## 2023-02-27 DIAGNOSIS — F32.A ANXIETY AND DEPRESSION: ICD-10-CM

## 2023-02-27 PROCEDURE — 99214 OFFICE O/P EST MOD 30 MIN: CPT | Performed by: NURSE PRACTITIONER

## 2023-02-27 RX ORDER — ARIPIPRAZOLE 2 MG/1
2 TABLET ORAL DAILY
Qty: 30 TABLET | Refills: 4 | Status: SHIPPED | OUTPATIENT
Start: 2023-02-27

## 2023-02-27 RX ORDER — PROPRANOLOL HYDROCHLORIDE 10 MG/1
10 TABLET ORAL 2 TIMES DAILY
Qty: 60 TABLET | Refills: 4 | Status: SHIPPED | OUTPATIENT
Start: 2023-02-27

## 2023-02-27 RX ORDER — BUPROPION HYDROCHLORIDE 150 MG/1
300 TABLET ORAL DAILY
Qty: 60 TABLET | Refills: 4 | Status: SHIPPED | OUTPATIENT
Start: 2023-02-27

## 2023-02-27 RX ORDER — DIAZEPAM 10 MG/1
10 TABLET ORAL
Qty: 75 TABLET | Refills: 4 | Status: SHIPPED | OUTPATIENT
Start: 2023-02-27

## 2023-02-27 NOTE — PROGRESS NOTES
CHIEF COMPLAINT:  Juan Abrams is a 40 y.o. female and was seen today for follow-up of psychiatric condition and psychotropic medication management. HPI:    [facility] reports the following psychiatric symptoms by hx:  depression and anxiety. Overall symptoms have been present for years. Currently symptoms are is of moderate severity. The symptoms occur daily. Pt reports medications are effective. Met with pt for appt today  to review current treatment plan. FAMILY/SOCIAL HX:   Psychosocial Stressors        REVIEW OF SYSTEMS:  Psychiatric symptoms being monitored for:  depression, ADHD, anxiety  Appetite:decreased   Sleep: improved   Neuro: denies     Visit Vitals  /64 (BP 1 Location: Left upper arm, BP Patient Position: Sitting, BP Cuff Size: Adult)   Pulse 61   Temp 98 °F (36.7 °C) (Oral)   Resp 16   Ht 5' 9\" (1.753 m)   Wt 87.6 kg (193 lb 3.2 oz)   BMI 28.53 kg/m²       Side Effects:  none    MENTAL STATUS EXAM:   Sensorium  oriented to time, place and person   Relations cooperative   Appearance:  age appropriate and within normal Limits   Motor Behavior:  within normal limits   Speech:  normal pitch and normal volume   Thought Process: within normal limits   Thought Content free of delusions and free of hallucinations   Suicidal ideations none   Homicidal ideations none   Mood:  Anxious   Affect:  Anxious    Memory recent  adequate   Memory remote:  adequate   Concentration:  adequate   Abstraction:  abstract   Insight:  good   Reliability good   Judgment:  good     MEDICAL DECISION MAKING:  Problems addressed today:    ICD-10-CM ICD-9-CM    1. Anxiety and depression  F41.9 300.00 diazePAM (Valium) 10 mg tablet    F32. A 311       2. Mood disorder (HCC)  F39 296.90 ARIPiprazole (ABILIFY) 2 mg tablet      buPROPion XL (WELLBUTRIN XL) 150 mg tablet      3. Anxiety  F41.9 300.00 propranoloL (INDERAL) 10 mg tablet            Assessment:   [facility] is responding to treatment. Symptoms are improved. Symptoms are less in severity and less in occurrence. Reinforced positive coping strategies. Patient understands benzo safety guidelines . Discussed current medications and dosages. No changes made today. Reviewed treatment goals and target symptoms to monitor for. Plan:   1. Current Outpatient Medications   Medication Sig Dispense Refill    diazePAM (Valium) 10 mg tablet Take 1 Tablet by mouth every eight (8) hours as needed for Anxiety. Max Daily Amount: 30 mg. 75 Tablet 4    ARIPiprazole (ABILIFY) 2 mg tablet Take 1 Tablet by mouth daily. 30 Tablet 4    buPROPion XL (WELLBUTRIN XL) 150 mg tablet Take 2 Tablets by mouth daily. 60 Tablet 4    propranoloL (INDERAL) 10 mg tablet Take 1 Tablet by mouth two (2) times a day. 60 Tablet 4    gabapentin (NEURONTIN) 100 mg capsule Take 1 Capsule by mouth every evening. Max Daily Amount: 100 mg. 90 Capsule 0    pantoprazole (PROTONIX) 40 mg tablet Take 1 Tablet by mouth daily. 30 Tablet 2          medication changes made today: No changes made today     2. Counseling and coordination of care including instructions for treatment, risks/benefits, risk factor reduction and patient/family education. She agrees with the plan. Patient instructed to call with any side effects, questions or issues.          2/27/2023  Rodney Schulte NP

## 2023-02-27 NOTE — PROGRESS NOTES
Chief Complaint   Patient presents with    Medication Management     Visit Vitals  Temp 98 °F (36.7 °C) (Oral)   Resp 16   Ht 5' 9\" (1.753 m)   Wt 87.6 kg (193 lb 3.2 oz)   BMI 28.53 kg/m²     3 most recent PHQ Screens 2/27/2023   Little interest or pleasure in doing things Several days   Feeling down, depressed, irritable, or hopeless Not at all   Total Score PHQ 2 1   Trouble falling or staying asleep, or sleeping too much Several days   Feeling tired or having little energy Not at all   Poor appetite, weight loss, or overeating Not at all   Feeling bad about yourself - or that you are a failure or have let yourself or your family down Not at all   Trouble concentrating on things such as school, work, reading, or watching TV Several days   Moving or speaking so slowly that other people could have noticed; or the opposite being so fidgety that others notice Not at all   Thoughts of being better off dead, or hurting yourself in some way Not at all   PHQ 9 Score 3   How difficult have these problems made it for you to do your work, take care of your home and get along with others Not difficult at all     1. Have you been to the ER, urgent care clinic since your last visit? Hospitalized since your last visit? Yes. [facility]. Flu    2. Have you seen or consulted any other health care providers outside of the [address] [place] [address] since your last visit? Include any pap smears or colon screening.  No

## 2023-04-19 ENCOUNTER — VIRTUAL VISIT (OUTPATIENT)
Dept: ENDOCRINOLOGY | Age: 43
End: 2023-04-19
Payer: MEDICAID

## 2023-04-19 DIAGNOSIS — E55.9 VITAMIN D DEFICIENCY: ICD-10-CM

## 2023-04-19 DIAGNOSIS — R23.2 HOT FLASHES: ICD-10-CM

## 2023-04-19 DIAGNOSIS — E05.10 TOXIC THYROID NODULE: Primary | ICD-10-CM

## 2023-04-19 DIAGNOSIS — E61.1 IRON DEFICIENCY: ICD-10-CM

## 2023-04-19 PROCEDURE — 99214 OFFICE O/P EST MOD 30 MIN: CPT | Performed by: INTERNAL MEDICINE

## 2023-04-19 NOTE — PROGRESS NOTES
Chief Complaint   Patient presents with    Excessive Sweating     History of Present Illness: Miroslava Jacob is a 40 y.o. female with a past medical hx significant for bipolar 1 do, GERD, chronic hep c seen in referral from Jak Young MD for discussion related to a toxic nodule. Initial visit:  Paula Weldon MD for hep c treatment- has second appt with Dr. Brice Acosta MD ENT on 28th of April- endorses feeling blockage from the nodule that improves with re-adjustment of positioning- denies dysphagia. Was started on propranolol for anxiety by Jak Young MD. Weight has dropped from 222-->211 has been going to the gym, chronic diarrhea. Endorses tremor. No known family hx of nodules. Endorses severe anxiety and \"like I'm going to crawl out of my skin\". Has not used since Sept. Was wondering if injecting into the vein at the base of the nodule contributed to enlargement- did have vocal cord paralysis for 1 day following injection in the area. 06/23/2022: Endorses irregular menstrual periods and hot flashes that cause soaking through her sheets. Is wondering if her thyroid has anything to do with this. Has not used since September. Surgery is in late July. Avoids taking her anxiety medications. 10/11/2022: Wellbutrin is being switched to effexor as of Monday. Sweating continues, predominantly at night-time. Anxiety is pretty bad right now, that also is being attributed to the wellbutrin. 04/19/2023: Hot flashes have improved significantly- having a very bad day today- car trouble, etc. Not feeling too groggy on gabapentin 100mg qhs. Current Outpatient Medications   Medication Sig    diazePAM (Valium) 10 mg tablet Take 1 Tablet by mouth every eight (8) hours as needed for Anxiety. Max Daily Amount: 30 mg. ARIPiprazole (ABILIFY) 2 mg tablet Take 1 Tablet by mouth daily. buPROPion XL (WELLBUTRIN XL) 150 mg tablet Take 2 Tablets by mouth daily.     propranoloL (INDERAL) 10 mg tablet Take 1 Tablet by mouth two (2) times a day.    gabapentin (NEURONTIN) 100 mg capsule Take 1 Capsule by mouth every evening. Max Daily Amount: 100 mg.    pantoprazole (PROTONIX) 40 mg tablet Take 1 Tablet by mouth daily. No current facility-administered medications for this visit. Social Hx: hasn't injected drugs since Sept 2021  MJ daily    Review of Systems:  See HPI    Physical Examination:  There were no vitals taken for this visit. - GENERAL: NCAT, Appears less anxious than last time  - EYES: EOMI, non-icteric, no proptosis   - Ear/Nose/Throat: L sided thyroid nodule is visible, injected at the base not in the actual nodule  - CARDIOVASCULAR: no cyanosis, no visible JVD   - RESPIRATORY: respiratory effort normal without any distress or labored breathing   - MUSCULOSKELETAL: Normal ROM of neck and upper extremities observed   - SKIN: No rash on face  - NEUROLOGIC:   Tremor is absent  - PSYCHIATRIC: Normal affect, Normal insight and judgement     Data Reviewed:         Assessment/Plan: This is a very pleasant 41 yo female with a past medical hx significant for chronic hep C, bipolar 1 d/o seen in follow-up for discussion related to a toxic nodule with esophageal/tracheal deviation. Underwent L sided-hemithyroidectomy with normal TFTs thereafter. Estradiol levels are normal as well. Suspect wellbutrin may be contributing to the sx- this was switched recently. If sx do not resolve with this switch, sx have improved with gabapentin 100mg qhs, continue this. #toxic nodule with tracheal/esophageal deviation  -s/p L sided ana-thyroidectomy  -TSH, Free T4, PTH normal post-op OFF levothyroxine    #Hot flashes  -FSH/LH, estradiol levels normal  -switching from wellbutrin to effexor  -continue gabapentin 100mg qhs. Copy sent to:Cristhian Strong MD    RTC 6 mo      Miroslava Jacob is a 40 y.o. female being evaluated by a Virtual Visit (video visit) encounter to address concerns as mentioned above.   A caregiver was present when appropriate. Due to this being a TeleHealth encounter (During NNNKK-98 public health emergency), evaluation of the following organ systems was limited:     Vitals/Constitutional/EENT/Resp/CV/GI//MS/Neuro/Skin/Heme-Lymph-Imm. Pursuant to the emergency declaration under the [address] [place] [address] authority and the [employer] Act, this Virtual Visit was conducted with patient's (and/or legal guardian's) consent, to reduce the risk of exposure to COVID-19 and provide necessary medical care. Services were provided through a video synchronous discussion virtually to substitute for in-person encounter. Seda Houser MD  [facility]     An electronic signature was used to authenticate this note.

## 2023-05-22 RX ORDER — PANTOPRAZOLE SODIUM 40 MG/1
40 TABLET, DELAYED RELEASE ORAL DAILY
COMMUNITY
Start: 2022-02-22

## 2023-05-22 RX ORDER — BUPROPION HYDROCHLORIDE 150 MG/1
300 TABLET ORAL DAILY
COMMUNITY
Start: 2023-02-27

## 2023-05-22 RX ORDER — DIAZEPAM 10 MG/1
10 TABLET ORAL EVERY 8 HOURS PRN
COMMUNITY
Start: 2023-02-27

## 2023-05-22 RX ORDER — PROPRANOLOL HYDROCHLORIDE 10 MG/1
10 TABLET ORAL 2 TIMES DAILY
COMMUNITY
Start: 2023-02-27

## 2023-05-22 RX ORDER — GABAPENTIN 100 MG/1
100 CAPSULE ORAL EVERY EVENING
COMMUNITY
Start: 2022-10-11

## 2023-05-22 RX ORDER — ARIPIPRAZOLE 2 MG/1
2 TABLET ORAL DAILY
COMMUNITY
Start: 2023-02-27